# Patient Record
Sex: FEMALE | Race: WHITE | NOT HISPANIC OR LATINO | Employment: OTHER | URBAN - METROPOLITAN AREA
[De-identification: names, ages, dates, MRNs, and addresses within clinical notes are randomized per-mention and may not be internally consistent; named-entity substitution may affect disease eponyms.]

---

## 2023-03-04 PROBLEM — M25.551 RIGHT HIP PAIN: Status: ACTIVE | Noted: 2023-03-04

## 2023-03-04 PROBLEM — H93.13 BILATERAL TINNITUS: Status: ACTIVE | Noted: 2023-03-04

## 2023-03-04 PROBLEM — M79.10 MYALGIA: Status: ACTIVE | Noted: 2023-03-04

## 2023-03-04 PROBLEM — M70.50 PES ANSERINE BURSITIS: Status: ACTIVE | Noted: 2023-03-04

## 2023-03-04 PROBLEM — M54.32 LEFT SIDED SCIATICA: Status: ACTIVE | Noted: 2023-03-04

## 2023-03-04 PROBLEM — M54.42 LEFT-SIDED LOW BACK PAIN WITH LEFT-SIDED SCIATICA: Status: ACTIVE | Noted: 2023-03-04

## 2023-03-04 PROBLEM — K29.80 DUODENITIS: Status: ACTIVE | Noted: 2023-03-04

## 2023-03-04 PROBLEM — G25.81 RESTLESS LEGS: Status: ACTIVE | Noted: 2023-03-04

## 2023-03-04 PROBLEM — M65.341 TRIGGER RING FINGER OF RIGHT HAND: Status: ACTIVE | Noted: 2023-03-04

## 2023-03-04 PROBLEM — J38.3 LESION OF VOCAL CORD: Status: ACTIVE | Noted: 2023-03-04

## 2023-03-04 PROBLEM — M65.331 TRIGGER MIDDLE FINGER OF RIGHT HAND: Status: ACTIVE | Noted: 2023-03-04

## 2023-03-04 PROBLEM — M79.18 MYOFASCIAL PAIN: Status: ACTIVE | Noted: 2023-03-04

## 2023-03-04 PROBLEM — B37.2 CUTANEOUS CANDIDIASIS: Status: ACTIVE | Noted: 2023-03-04

## 2023-03-04 PROBLEM — M19.042 OSTEOARTHRITIS OF FINGERS OF HANDS, BILATERAL: Status: ACTIVE | Noted: 2023-03-04

## 2023-03-04 PROBLEM — H61.23 BILATERAL IMPACTED CERUMEN: Status: ACTIVE | Noted: 2023-03-04

## 2023-03-04 PROBLEM — K58.9 IBS (IRRITABLE BOWEL SYNDROME): Status: ACTIVE | Noted: 2023-03-04

## 2023-03-04 PROBLEM — M53.3 SACROILIAC JOINT DYSFUNCTION: Status: ACTIVE | Noted: 2023-03-04

## 2023-03-04 PROBLEM — M72.0 DUPUYTREN'S CONTRACTURE: Status: ACTIVE | Noted: 2023-03-04

## 2023-03-04 PROBLEM — J06.9 UPPER RESPIRATORY INFECTION: Status: ACTIVE | Noted: 2023-03-04

## 2023-03-04 PROBLEM — R05.9 COUGH: Status: ACTIVE | Noted: 2023-03-04

## 2023-03-04 PROBLEM — J37.0 CHRONIC LARYNGITIS: Status: ACTIVE | Noted: 2023-03-04

## 2023-03-04 PROBLEM — M10.9 GOUT: Status: ACTIVE | Noted: 2023-03-04

## 2023-03-04 PROBLEM — M99.01 SEGMENTAL AND SOMATIC DYSFUNCTION OF CERVICAL REGION: Status: ACTIVE | Noted: 2023-03-04

## 2023-03-04 PROBLEM — R06.2 BILATERAL WHEEZING: Status: ACTIVE | Noted: 2023-03-04

## 2023-03-04 PROBLEM — G56.01 CARPAL TUNNEL SYNDROME OF RIGHT WRIST: Status: ACTIVE | Noted: 2023-03-04

## 2023-03-04 PROBLEM — M54.2 NECK PAIN: Status: ACTIVE | Noted: 2023-03-04

## 2023-03-04 PROBLEM — R13.14 PHARYNGOESOPHAGEAL PHASE DYSPHAGIA: Status: ACTIVE | Noted: 2023-03-04

## 2023-03-04 PROBLEM — J38.3: Status: ACTIVE | Noted: 2023-03-04

## 2023-03-04 PROBLEM — R20.2 ARM PARESTHESIA, RIGHT: Status: ACTIVE | Noted: 2023-03-04

## 2023-03-04 PROBLEM — I48.91 A-FIB (MULTI): Status: ACTIVE | Noted: 2023-03-04

## 2023-03-04 PROBLEM — M75.41 INTERNAL IMPINGEMENT OF RIGHT SHOULDER: Status: ACTIVE | Noted: 2023-03-04

## 2023-03-04 PROBLEM — G58.8 INTERCOSTAL NEURALGIA: Status: ACTIVE | Noted: 2023-03-04

## 2023-03-04 PROBLEM — R29.898 RUE WEAKNESS: Status: ACTIVE | Noted: 2023-03-04

## 2023-03-04 PROBLEM — I10 ESSENTIAL HYPERTENSION: Status: ACTIVE | Noted: 2023-03-04

## 2023-03-04 PROBLEM — R26.89 BALANCE PROBLEM: Status: ACTIVE | Noted: 2023-03-04

## 2023-03-04 PROBLEM — M17.0 ARTHRITIS OF BOTH KNEES: Status: ACTIVE | Noted: 2023-03-04

## 2023-03-04 PROBLEM — Z98.890 HISTORY OF REVERSE TOTAL REPLACEMENT OF RIGHT SHOULDER JOINT: Status: ACTIVE | Noted: 2023-03-04

## 2023-03-04 PROBLEM — R73.9 BLOOD GLUCOSE ELEVATED: Status: ACTIVE | Noted: 2023-03-04

## 2023-03-04 PROBLEM — H90.6 MIXED CONDUCTIVE AND SENSORINEURAL HEARING LOSS OF BOTH EARS: Status: ACTIVE | Noted: 2023-03-04

## 2023-03-04 PROBLEM — Z96.659 PAIN IN KNEE REGION AFTER TOTAL KNEE REPLACEMENT (CMS-HCC): Status: ACTIVE | Noted: 2023-03-04

## 2023-03-04 PROBLEM — R20.0 NUMBNESS OF LIP: Status: ACTIVE | Noted: 2023-03-04

## 2023-03-04 PROBLEM — N28.9 RENAL INSUFFICIENCY: Status: ACTIVE | Noted: 2023-03-04

## 2023-03-04 PROBLEM — S42.209A PROXIMAL HUMERUS FRACTURE: Status: ACTIVE | Noted: 2023-03-04

## 2023-03-04 PROBLEM — R09.82 POSTNASAL DRIP: Status: ACTIVE | Noted: 2023-03-04

## 2023-03-04 PROBLEM — G89.29 CHRONIC PAIN: Status: ACTIVE | Noted: 2023-03-04

## 2023-03-04 PROBLEM — R92.8 ABNORMALITY OF RIGHT BREAST ON SCREENING MAMMOGRAM: Status: ACTIVE | Noted: 2023-03-04

## 2023-03-04 PROBLEM — R29.898 FINE MOTOR IMPAIRMENT: Status: ACTIVE | Noted: 2023-03-04

## 2023-03-04 PROBLEM — M54.16 LUMBAR RADICULOPATHY: Status: ACTIVE | Noted: 2023-03-04

## 2023-03-04 PROBLEM — E78.5 HYPERLIPIDEMIA: Status: ACTIVE | Noted: 2023-03-04

## 2023-03-04 PROBLEM — M19.90 ARTHRITIS: Status: ACTIVE | Noted: 2023-03-04

## 2023-03-04 PROBLEM — M19.041 OSTEOARTHRITIS OF FINGERS OF HANDS, BILATERAL: Status: ACTIVE | Noted: 2023-03-04

## 2023-03-04 PROBLEM — S29.019A THORACIC MYOFASCIAL STRAIN: Status: ACTIVE | Noted: 2023-03-04

## 2023-03-04 PROBLEM — R49.0 CHRONIC HOARSENESS: Status: ACTIVE | Noted: 2023-03-04

## 2023-03-04 PROBLEM — M25.562 KNEE PAIN, LEFT: Status: ACTIVE | Noted: 2023-03-04

## 2023-03-04 PROBLEM — J31.0 CHRONIC RHINITIS: Status: ACTIVE | Noted: 2023-03-04

## 2023-03-04 PROBLEM — F32.A DEPRESSION: Status: ACTIVE | Noted: 2023-03-04

## 2023-03-04 PROBLEM — R53.83 FATIGUE: Status: ACTIVE | Noted: 2023-03-04

## 2023-03-04 PROBLEM — M54.12 CERVICAL RADICULITIS: Status: ACTIVE | Noted: 2023-03-04

## 2023-03-04 PROBLEM — R00.2 PALPITATIONS: Status: ACTIVE | Noted: 2023-03-04

## 2023-03-04 PROBLEM — R20.0 NUMBNESS OF TONGUE: Status: ACTIVE | Noted: 2023-03-04

## 2023-03-04 PROBLEM — H61.21 HEARING LOSS OF RIGHT EAR DUE TO CERUMEN IMPACTION: Status: ACTIVE | Noted: 2023-03-04

## 2023-03-04 PROBLEM — F41.9 ANXIETY: Status: ACTIVE | Noted: 2023-03-04

## 2023-03-04 PROBLEM — G25.0 BENIGN FAMILIAL TREMOR: Status: ACTIVE | Noted: 2023-03-04

## 2023-03-04 PROBLEM — R05.3 CHRONIC COUGH: Status: ACTIVE | Noted: 2023-03-04

## 2023-03-04 PROBLEM — T84.84XA PAIN IN KNEE REGION AFTER TOTAL KNEE REPLACEMENT (CMS-HCC): Status: ACTIVE | Noted: 2023-03-04

## 2023-03-04 PROBLEM — G89.29 CHRONIC RIGHT SHOULDER PAIN: Status: ACTIVE | Noted: 2023-03-04

## 2023-03-04 PROBLEM — M70.62 TROCHANTERIC BURSITIS OF LEFT HIP: Status: ACTIVE | Noted: 2023-03-04

## 2023-03-04 PROBLEM — M54.50 LUMBAR SPINE PAIN: Status: ACTIVE | Noted: 2023-03-04

## 2023-03-04 PROBLEM — M50.30 DDD (DEGENERATIVE DISC DISEASE), CERVICAL: Status: ACTIVE | Noted: 2023-03-04

## 2023-03-04 PROBLEM — R29.818 FINE MOTOR IMPAIRMENT: Status: ACTIVE | Noted: 2023-03-04

## 2023-03-04 PROBLEM — Z96.651 STATUS POST TOTAL RIGHT KNEE REPLACEMENT: Status: ACTIVE | Noted: 2023-03-04

## 2023-03-04 PROBLEM — E03.9 HYPOTHYROIDISM: Status: ACTIVE | Noted: 2023-03-04

## 2023-03-04 PROBLEM — M25.511 CHRONIC RIGHT SHOULDER PAIN: Status: ACTIVE | Noted: 2023-03-04

## 2023-03-04 PROBLEM — R73.09 ELEVATED GLUCOSE: Status: ACTIVE | Noted: 2023-03-04

## 2023-03-04 PROBLEM — R29.898 HAND WEAKNESS: Status: ACTIVE | Noted: 2023-03-04

## 2023-03-04 PROBLEM — R51.9 HEADACHE: Status: ACTIVE | Noted: 2023-03-04

## 2023-03-04 PROBLEM — J30.9 ALLERGIC RHINITIS: Status: ACTIVE | Noted: 2023-03-04

## 2023-03-04 PROBLEM — Z96.652 STATUS POST TOTAL LEFT KNEE REPLACEMENT: Status: ACTIVE | Noted: 2023-03-04

## 2023-03-04 PROBLEM — E55.9 VITAMIN D DEFICIENCY: Status: ACTIVE | Noted: 2023-03-04

## 2023-03-04 PROBLEM — R10.84 GENERALIZED ABDOMINAL PAIN: Status: ACTIVE | Noted: 2023-03-04

## 2023-03-04 RX ORDER — VITS A,C,E/LUTEIN/MINERALS 300MCG-200
1 TABLET ORAL DAILY
COMMUNITY

## 2023-03-04 RX ORDER — VENLAFAXINE HYDROCHLORIDE 75 MG/1
1 CAPSULE, EXTENDED RELEASE ORAL DAILY
COMMUNITY
Start: 2018-09-18 | End: 2023-08-08

## 2023-03-04 RX ORDER — ACETAMINOPHEN 500 MG
TABLET ORAL
COMMUNITY
Start: 2019-12-02

## 2023-03-04 RX ORDER — LISINOPRIL 20 MG/1
1 TABLET ORAL 2 TIMES DAILY
COMMUNITY
Start: 2013-08-14

## 2023-03-04 RX ORDER — MELOXICAM 7.5 MG/1
7.5 TABLET ORAL DAILY
COMMUNITY
Start: 2022-10-31 | End: 2023-03-08 | Stop reason: ALTCHOICE

## 2023-03-04 RX ORDER — NITROGLYCERIN 0.4 MG/1
0.4 TABLET SUBLINGUAL
COMMUNITY

## 2023-03-04 RX ORDER — CHOLECALCIFEROL (VITAMIN D3) 50 MCG
2000 TABLET ORAL DAILY
COMMUNITY

## 2023-03-04 RX ORDER — PRAVASTATIN SODIUM 40 MG/1
1 TABLET ORAL DAILY
COMMUNITY
Start: 2016-04-08 | End: 2023-05-08

## 2023-03-04 RX ORDER — ACETAMINOPHEN AND DIPHENHYDRAMINE HYDROCHLORIDE 500; 25 MG/1; MG/1
TABLET, FILM COATED ORAL
COMMUNITY
Start: 2015-03-23 | End: 2023-03-08

## 2023-03-04 RX ORDER — SOTALOL HYDROCHLORIDE 120 MG/1
TABLET ORAL
COMMUNITY
Start: 2016-12-20 | End: 2023-10-03 | Stop reason: SDUPTHER

## 2023-03-04 RX ORDER — LEVOTHYROXINE SODIUM 75 UG/1
1 TABLET ORAL DAILY
COMMUNITY
Start: 2014-04-07 | End: 2023-06-05

## 2023-03-04 RX ORDER — ASPIRIN 81 MG/1
1 TABLET ORAL EVERY OTHER DAY
COMMUNITY
Start: 2015-04-20

## 2023-03-04 RX ORDER — CALCIUM CARBONATE 600 MG
1 TABLET ORAL 2 TIMES DAILY
COMMUNITY

## 2023-03-04 RX ORDER — LORATADINE 10 MG/1
TABLET ORAL
COMMUNITY
End: 2023-12-15 | Stop reason: ALTCHOICE

## 2023-03-04 RX ORDER — DILTIAZEM HYDROCHLORIDE 180 MG/1
1 CAPSULE, EXTENDED RELEASE ORAL DAILY
COMMUNITY
Start: 2016-07-15 | End: 2023-11-02 | Stop reason: WASHOUT

## 2023-03-08 ENCOUNTER — OFFICE VISIT (OUTPATIENT)
Dept: PRIMARY CARE | Facility: CLINIC | Age: 78
End: 2023-03-08
Payer: MEDICARE

## 2023-03-08 VITALS
RESPIRATION RATE: 15 BRPM | BODY MASS INDEX: 30.31 KG/M2 | DIASTOLIC BLOOD PRESSURE: 56 MMHG | HEART RATE: 67 BPM | SYSTOLIC BLOOD PRESSURE: 98 MMHG | OXYGEN SATURATION: 98 % | TEMPERATURE: 96.6 F | WEIGHT: 188.6 LBS | HEIGHT: 66 IN

## 2023-03-08 DIAGNOSIS — R05.1 ACUTE COUGH: Primary | ICD-10-CM

## 2023-03-08 PROCEDURE — 3078F DIAST BP <80 MM HG: CPT | Performed by: STUDENT IN AN ORGANIZED HEALTH CARE EDUCATION/TRAINING PROGRAM

## 2023-03-08 PROCEDURE — 1157F ADVNC CARE PLAN IN RCRD: CPT | Performed by: STUDENT IN AN ORGANIZED HEALTH CARE EDUCATION/TRAINING PROGRAM

## 2023-03-08 PROCEDURE — 1159F MED LIST DOCD IN RCRD: CPT | Performed by: STUDENT IN AN ORGANIZED HEALTH CARE EDUCATION/TRAINING PROGRAM

## 2023-03-08 PROCEDURE — 99213 OFFICE O/P EST LOW 20 MIN: CPT | Performed by: STUDENT IN AN ORGANIZED HEALTH CARE EDUCATION/TRAINING PROGRAM

## 2023-03-08 PROCEDURE — 1036F TOBACCO NON-USER: CPT | Performed by: STUDENT IN AN ORGANIZED HEALTH CARE EDUCATION/TRAINING PROGRAM

## 2023-03-08 PROCEDURE — 3074F SYST BP LT 130 MM HG: CPT | Performed by: STUDENT IN AN ORGANIZED HEALTH CARE EDUCATION/TRAINING PROGRAM

## 2023-03-08 RX ORDER — ALBUTEROL SULFATE 90 UG/1
2 AEROSOL, METERED RESPIRATORY (INHALATION) EVERY 4 HOURS PRN
Qty: 8.5 G | Refills: 5 | Status: SHIPPED | OUTPATIENT
Start: 2023-03-08 | End: 2024-03-07

## 2023-03-08 RX ORDER — BENZONATATE 200 MG/1
200 CAPSULE ORAL 3 TIMES DAILY PRN
Qty: 42 CAPSULE | Refills: 0 | Status: SHIPPED | OUTPATIENT
Start: 2023-03-08 | End: 2023-03-27 | Stop reason: SDUPTHER

## 2023-03-08 ASSESSMENT — ENCOUNTER SYMPTOMS
OCCASIONAL FEELINGS OF UNSTEADINESS: 1
COUGH: 1
LOSS OF SENSATION IN FEET: 0
DEPRESSION: 0

## 2023-03-08 NOTE — PROGRESS NOTES
Subjective   Patient ID: Natividad Tena is a 77 y.o. female who presents for Cough (Pt is here for a cough that she has has since the beginning of Feb.).  Had sinus pressure and pain, URI symptoms started at the beginning of Feb.     Yesterday when she was driving, she is starting to loose her breathe  Cough severe-aching all over for it   Used her 's proAir   Having coughing fits   Drinking warm hot liquids, cough drops   Dry mouth    Cough  This is a new problem. The current episode started 1 to 4 weeks ago. The problem has been unchanged. The problem occurs constantly. The cough is Non-productive. Associated symptoms include nasal congestion and postnasal drip. The treatment provided mild relief.       Review of Systems   HENT:  Positive for postnasal drip.    Respiratory:  Positive for cough.        Objective   Physical Exam  Constitutional:       Appearance: Normal appearance.   HENT:      Head:      Comments: Positive cobblestoning  Cardiovascular:      Rate and Rhythm: Normal rate and regular rhythm.      Heart sounds: No murmur heard.  Pulmonary:      Effort: Pulmonary effort is normal. No respiratory distress.      Breath sounds: Normal breath sounds. No wheezing.   Musculoskeletal:      Cervical back: Neck supple.      Left lower leg: No edema.   Neurological:      Mental Status: She is alert.         Assessment/Plan   Diagnoses and all orders for this visit:  Acute cough  Comments:  likely allergy related   recommend tessalon andreas antihistamines recommended   albuterol sent  Orders:  -     benzonatate (Tessalon) 200 mg capsule; Take 1 capsule (200 mg) by mouth 3 times a day as needed for cough. Do not crush or chew.  -     albuterol (ProAir HFA) 90 mcg/actuation inhaler; Inhale 2 puffs every 4 hours if needed for wheezing or shortness of breath.  -     XR chest 2 views; Future

## 2023-03-08 NOTE — ASSESSMENT & PLAN NOTE
Physical exam sig for cobblestoning  Albuterol 2 puffs Q  4-6 hours  Tessalon pearls   CXR  
Patient

## 2023-03-27 ENCOUNTER — OFFICE VISIT (OUTPATIENT)
Dept: PRIMARY CARE | Facility: CLINIC | Age: 78
End: 2023-03-27
Payer: MEDICARE

## 2023-03-27 VITALS
SYSTOLIC BLOOD PRESSURE: 88 MMHG | WEIGHT: 198.2 LBS | DIASTOLIC BLOOD PRESSURE: 50 MMHG | HEIGHT: 65 IN | BODY MASS INDEX: 33.02 KG/M2 | HEART RATE: 67 BPM | OXYGEN SATURATION: 96 % | RESPIRATION RATE: 16 BRPM

## 2023-03-27 DIAGNOSIS — R05.1 ACUTE COUGH: ICD-10-CM

## 2023-03-27 PROCEDURE — 1036F TOBACCO NON-USER: CPT | Performed by: STUDENT IN AN ORGANIZED HEALTH CARE EDUCATION/TRAINING PROGRAM

## 2023-03-27 PROCEDURE — 3078F DIAST BP <80 MM HG: CPT | Performed by: STUDENT IN AN ORGANIZED HEALTH CARE EDUCATION/TRAINING PROGRAM

## 2023-03-27 PROCEDURE — 3074F SYST BP LT 130 MM HG: CPT | Performed by: STUDENT IN AN ORGANIZED HEALTH CARE EDUCATION/TRAINING PROGRAM

## 2023-03-27 PROCEDURE — 1160F RVW MEDS BY RX/DR IN RCRD: CPT | Performed by: STUDENT IN AN ORGANIZED HEALTH CARE EDUCATION/TRAINING PROGRAM

## 2023-03-27 PROCEDURE — 1159F MED LIST DOCD IN RCRD: CPT | Performed by: STUDENT IN AN ORGANIZED HEALTH CARE EDUCATION/TRAINING PROGRAM

## 2023-03-27 PROCEDURE — 1157F ADVNC CARE PLAN IN RCRD: CPT | Performed by: STUDENT IN AN ORGANIZED HEALTH CARE EDUCATION/TRAINING PROGRAM

## 2023-03-27 PROCEDURE — 99213 OFFICE O/P EST LOW 20 MIN: CPT | Performed by: STUDENT IN AN ORGANIZED HEALTH CARE EDUCATION/TRAINING PROGRAM

## 2023-03-27 RX ORDER — BENZONATATE 200 MG/1
200 CAPSULE ORAL 3 TIMES DAILY PRN
Qty: 42 CAPSULE | Refills: 0 | Status: SHIPPED | OUTPATIENT
Start: 2023-03-27 | End: 2023-04-26

## 2023-03-27 RX ORDER — NAPROXEN SODIUM 220 MG
TABLET ORAL EVERY 12 HOURS
COMMUNITY

## 2023-03-27 RX ORDER — IBUPROFEN 200 MG
TABLET ORAL EVERY 8 HOURS
COMMUNITY

## 2023-03-27 RX ORDER — LUTEIN 10 MG
TABLET ORAL EVERY 24 HOURS
COMMUNITY

## 2023-03-27 ASSESSMENT — ENCOUNTER SYMPTOMS
DEPRESSION: 0
LOSS OF SENSATION IN FEET: 0
OCCASIONAL FEELINGS OF UNSTEADINESS: 1
COUGH: 1

## 2023-03-27 ASSESSMENT — PATIENT HEALTH QUESTIONNAIRE - PHQ9
2. FEELING DOWN, DEPRESSED OR HOPELESS: NOT AT ALL
1. LITTLE INTEREST OR PLEASURE IN DOING THINGS: NOT AT ALL
SUM OF ALL RESPONSES TO PHQ9 QUESTIONS 1 AND 2: 0

## 2023-03-27 NOTE — PROGRESS NOTES
Subjective   Patient ID: Natividad Tena is a 77 y.o. female who presents for Cough (Pt is here for her persistent cough. Pt would like x-ray results.).    Pt returns today for further evaluation of her persistent cough.     CXR negative for acute pathology   Tessalon pearls are helpful along with the albuterol  Albuterol wears off in a couple of hours   She feels  Warm water is improving   Feeling that the energy   Working didn't cause the    Wedge pillow has been obtained    Cough  This is a chronic problem. The current episode started 1 to 4 weeks ago. The problem has been gradually improving. The problem occurs hourly. The cough is Non-productive. Bronchiectasis: pulmonary function testing.       Review of Systems   Respiratory:  Positive for cough.        Objective   Physical Exam  Constitutional:       Appearance: Normal appearance.   Cardiovascular:      Rate and Rhythm: Normal rate and regular rhythm.      Heart sounds: No murmur heard.  Pulmonary:      Effort: Pulmonary effort is normal. No respiratory distress.      Breath sounds: Normal breath sounds. No wheezing.   Musculoskeletal:      Cervical back: Neck supple.      Left lower leg: No edema.   Neurological:      Mental Status: She is alert.       Assessment/Plan   Problem List Items Addressed This Visit          Respiratory    Cough    Relevant Medications    benzonatate (Tessalon) 200 mg capsule    Other Relevant Orders    Pulmonary function test

## 2023-05-08 DIAGNOSIS — E78.5 HYPERLIPIDEMIA, UNSPECIFIED HYPERLIPIDEMIA TYPE: Primary | ICD-10-CM

## 2023-05-08 RX ORDER — PRAVASTATIN SODIUM 40 MG/1
TABLET ORAL
Qty: 90 TABLET | Refills: 0 | Status: SHIPPED | OUTPATIENT
Start: 2023-05-08 | End: 2023-08-21

## 2023-06-03 DIAGNOSIS — E03.9 HYPOTHYROIDISM, UNSPECIFIED TYPE: ICD-10-CM

## 2023-06-05 RX ORDER — LEVOTHYROXINE SODIUM 75 UG/1
TABLET ORAL
Qty: 90 TABLET | Refills: 1 | Status: SHIPPED | OUTPATIENT
Start: 2023-06-05 | End: 2023-12-04

## 2023-07-21 ENCOUNTER — HOSPITAL ENCOUNTER (OUTPATIENT)
Dept: DATA CONVERSION | Facility: HOSPITAL | Age: 78
End: 2023-07-21
Attending: INTERNAL MEDICINE
Payer: MEDICARE

## 2023-07-21 DIAGNOSIS — R13.10 DYSPHAGIA, UNSPECIFIED: ICD-10-CM

## 2023-07-21 DIAGNOSIS — K29.61 OTHER GASTRITIS WITH BLEEDING: ICD-10-CM

## 2023-07-31 LAB
COMPLETE PATHOLOGY REPORT: NORMAL
CONVERTED CLINICAL DIAGNOSIS-HISTORY: NORMAL
CONVERTED FINAL DIAGNOSIS: NORMAL
CONVERTED FINAL REPORT PDF LINK TO COPY AND PASTE: NORMAL
CONVERTED GROSS DESCRIPTION: NORMAL

## 2023-08-08 DIAGNOSIS — F41.9 ANXIETY: ICD-10-CM

## 2023-08-08 RX ORDER — VENLAFAXINE HYDROCHLORIDE 75 MG/1
75 CAPSULE, EXTENDED RELEASE ORAL DAILY
Qty: 30 CAPSULE | Refills: 0 | Status: SHIPPED | OUTPATIENT
Start: 2023-08-08 | End: 2023-09-05 | Stop reason: SDUPTHER

## 2023-08-19 DIAGNOSIS — E78.5 HYPERLIPIDEMIA, UNSPECIFIED HYPERLIPIDEMIA TYPE: ICD-10-CM

## 2023-08-21 RX ORDER — PRAVASTATIN SODIUM 40 MG/1
40 TABLET ORAL DAILY
Qty: 30 TABLET | Refills: 0 | Status: SHIPPED | OUTPATIENT
Start: 2023-08-21 | End: 2023-09-05 | Stop reason: SDUPTHER

## 2023-09-05 ENCOUNTER — OFFICE VISIT (OUTPATIENT)
Dept: PRIMARY CARE | Facility: CLINIC | Age: 78
End: 2023-09-05
Payer: MEDICARE

## 2023-09-05 VITALS
BODY MASS INDEX: 31.86 KG/M2 | DIASTOLIC BLOOD PRESSURE: 68 MMHG | SYSTOLIC BLOOD PRESSURE: 118 MMHG | HEIGHT: 65 IN | RESPIRATION RATE: 19 BRPM | WEIGHT: 191.2 LBS | HEART RATE: 96 BPM | OXYGEN SATURATION: 97 %

## 2023-09-05 DIAGNOSIS — G47.39 OTHER SLEEP APNEA: ICD-10-CM

## 2023-09-05 DIAGNOSIS — J44.9 CHRONIC OBSTRUCTIVE PULMONARY DISEASE, UNSPECIFIED COPD TYPE (MULTI): ICD-10-CM

## 2023-09-05 DIAGNOSIS — F41.9 ANXIETY: ICD-10-CM

## 2023-09-05 DIAGNOSIS — E78.5 HYPERLIPIDEMIA, UNSPECIFIED HYPERLIPIDEMIA TYPE: ICD-10-CM

## 2023-09-05 DIAGNOSIS — I10 HYPERTENSION, UNSPECIFIED TYPE: ICD-10-CM

## 2023-09-05 DIAGNOSIS — G89.29 OTHER CHRONIC PAIN: Primary | ICD-10-CM

## 2023-09-05 PROBLEM — M25.511 RIGHT SHOULDER PAIN: Status: ACTIVE | Noted: 2023-09-05

## 2023-09-05 PROBLEM — M17.12 ARTHRITIS OF KNEE, LEFT: Status: ACTIVE | Noted: 2023-09-05

## 2023-09-05 PROBLEM — J45.909 REACTIVE AIRWAY DISEASE (HHS-HCC): Status: ACTIVE | Noted: 2023-09-05

## 2023-09-05 PROBLEM — R13.10 DYSPHAGIA: Status: ACTIVE | Noted: 2023-09-05

## 2023-09-05 PROBLEM — I48.0 PAROXYSMAL ATRIAL FIBRILLATION (MULTI): Status: ACTIVE | Noted: 2023-09-05

## 2023-09-05 PROBLEM — I20.9 ANGINA PECTORIS (CMS-HCC): Status: ACTIVE | Noted: 2023-09-05

## 2023-09-05 PROBLEM — H93.19 TINNITUS: Status: ACTIVE | Noted: 2023-09-05

## 2023-09-05 PROBLEM — M17.11 OSTEOARTHRITIS OF RIGHT KNEE: Status: ACTIVE | Noted: 2023-09-05

## 2023-09-05 PROBLEM — G45.9 TRANSIENT ISCHEMIC ATTACK: Status: ACTIVE | Noted: 2023-09-05

## 2023-09-05 PROBLEM — M25.569 KNEE PAIN: Status: ACTIVE | Noted: 2023-09-05

## 2023-09-05 PROBLEM — M54.50 LOW BACK PAIN: Status: ACTIVE | Noted: 2023-09-05

## 2023-09-05 PROBLEM — K29.70 GASTRITIS: Status: ACTIVE | Noted: 2023-09-05

## 2023-09-05 PROBLEM — M54.16 ACUTE LEFT LUMBAR RADICULOPATHY: Status: ACTIVE | Noted: 2023-09-05

## 2023-09-05 PROBLEM — R29.818 SUSPECTED SLEEP APNEA: Status: ACTIVE | Noted: 2023-09-05

## 2023-09-05 PROBLEM — R91.8 LUNG NODULES: Status: ACTIVE | Noted: 2023-09-05

## 2023-09-05 PROBLEM — R06.09 DOE (DYSPNEA ON EXERTION): Status: ACTIVE | Noted: 2023-09-05

## 2023-09-05 PROBLEM — G47.33 MILD OBSTRUCTIVE SLEEP APNEA: Status: ACTIVE | Noted: 2023-09-05

## 2023-09-05 PROCEDURE — 1157F ADVNC CARE PLAN IN RCRD: CPT | Performed by: STUDENT IN AN ORGANIZED HEALTH CARE EDUCATION/TRAINING PROGRAM

## 2023-09-05 PROCEDURE — 1036F TOBACCO NON-USER: CPT | Performed by: STUDENT IN AN ORGANIZED HEALTH CARE EDUCATION/TRAINING PROGRAM

## 2023-09-05 PROCEDURE — 3074F SYST BP LT 130 MM HG: CPT | Performed by: STUDENT IN AN ORGANIZED HEALTH CARE EDUCATION/TRAINING PROGRAM

## 2023-09-05 PROCEDURE — 1159F MED LIST DOCD IN RCRD: CPT | Performed by: STUDENT IN AN ORGANIZED HEALTH CARE EDUCATION/TRAINING PROGRAM

## 2023-09-05 PROCEDURE — 1160F RVW MEDS BY RX/DR IN RCRD: CPT | Performed by: STUDENT IN AN ORGANIZED HEALTH CARE EDUCATION/TRAINING PROGRAM

## 2023-09-05 PROCEDURE — 3078F DIAST BP <80 MM HG: CPT | Performed by: STUDENT IN AN ORGANIZED HEALTH CARE EDUCATION/TRAINING PROGRAM

## 2023-09-05 PROCEDURE — 99214 OFFICE O/P EST MOD 30 MIN: CPT | Performed by: STUDENT IN AN ORGANIZED HEALTH CARE EDUCATION/TRAINING PROGRAM

## 2023-09-05 PROCEDURE — 1126F AMNT PAIN NOTED NONE PRSNT: CPT | Performed by: STUDENT IN AN ORGANIZED HEALTH CARE EDUCATION/TRAINING PROGRAM

## 2023-09-05 RX ORDER — MELOXICAM 7.5 MG/1
TABLET ORAL
COMMUNITY
Start: 2022-10-31

## 2023-09-05 RX ORDER — AMITRIPTYLINE HYDROCHLORIDE 10 MG/1
10 TABLET, FILM COATED ORAL NIGHTLY
Qty: 30 TABLET | Refills: 11 | Status: SHIPPED | OUTPATIENT
Start: 2023-09-05 | End: 2024-09-04

## 2023-09-05 RX ORDER — VENLAFAXINE HYDROCHLORIDE 75 MG/1
75 CAPSULE, EXTENDED RELEASE ORAL DAILY
Qty: 90 CAPSULE | Refills: 1 | Status: SHIPPED | OUTPATIENT
Start: 2023-09-05 | End: 2024-04-01

## 2023-09-05 RX ORDER — FLUTICASONE PROPIONATE 50 MCG
SPRAY, SUSPENSION (ML) NASAL EVERY 24 HOURS
COMMUNITY

## 2023-09-05 RX ORDER — ACETAMINOPHEN AND DIPHENHYDRAMINE HYDROCHLORIDE 500; 25 MG/1; MG/1
TABLET, FILM COATED ORAL
COMMUNITY
Start: 2015-03-23

## 2023-09-05 RX ORDER — PRAVASTATIN SODIUM 40 MG/1
40 TABLET ORAL DAILY
Qty: 90 TABLET | Refills: 1 | Status: SHIPPED | OUTPATIENT
Start: 2023-09-05 | End: 2024-04-01

## 2023-09-05 RX ORDER — PANTOPRAZOLE SODIUM 40 MG/1
1 TABLET, DELAYED RELEASE ORAL
COMMUNITY
Start: 2023-08-22 | End: 2023-10-21

## 2023-09-05 RX ORDER — LOPERAMIDE HYDROCHLORIDE 2 MG/1
CAPSULE ORAL
COMMUNITY

## 2023-09-05 RX ORDER — FLUTICASONE PROPIONATE 232 UG/1
POWDER, METERED RESPIRATORY (INHALATION) 2 TIMES DAILY
COMMUNITY
Start: 2023-05-24

## 2023-09-05 ASSESSMENT — PATIENT HEALTH QUESTIONNAIRE - PHQ9
SUM OF ALL RESPONSES TO PHQ9 QUESTIONS 1 AND 2: 0
2. FEELING DOWN, DEPRESSED OR HOPELESS: NOT AT ALL
1. LITTLE INTEREST OR PLEASURE IN DOING THINGS: NOT AT ALL

## 2023-09-05 NOTE — PROGRESS NOTES
Subjective   Patient ID: Natividad Tena is a 77 y.o. female who presents for Follow-up (Pt is here for a follow up and has some concerns about her legs.).    GI found a minor bleed on EGD started on Protonix   Right legs she is getting pain intermittently on the shin now radiating down to the foot, right thigh   Sudden, sharp pain   Keeping her up at night time  Sitting down too long will make it worse   Finding herself trying to get more comfortable               Review of Systems    Objective   Physical Exam  Vitals reviewed.   Constitutional:       Appearance: Normal appearance.   Cardiovascular:      Rate and Rhythm: Normal rate and regular rhythm.      Heart sounds: No murmur heard.  Pulmonary:      Effort: Pulmonary effort is normal. No respiratory distress.      Breath sounds: Normal breath sounds. No wheezing.   Musculoskeletal:      Cervical back: Neck supple.      Left lower leg: No edema.   Neurological:      Mental Status: She is alert.         Assessment/Plan   Diagnoses and all orders for this visit:  Other chronic pain  Comments:  Her leg pain has continued magnesium did not work well for her  We will try amitriptyline 10 mg  Orders:  -     amitriptyline (Elavil) 10 mg tablet; Take 1 tablet (10 mg) by mouth once daily at bedtime.  Anxiety  Comments:  Controlled  Refilled vent Aflaxen 75 mg daily  Orders:  -     venlafaxine XR (Effexor-XR) 75 mg 24 hr capsule; Take 1 capsule (75 mg) by mouth once daily.  Hyperlipidemia, unspecified hyperlipidemia type  Comments:  Lipid panel up-to-date  Pravastatin continue 40 mg daily  Orders:  -     pravastatin (Pravachol) 40 mg tablet; Take 1 tablet (40 mg) by mouth once daily.  Hypertension, unspecified type  Comments:  Controlled  Continue lisinopril 20 mg daily  Continue sotalol 120 mg daily  Chronic obstructive pulmonary disease, unspecified COPD type (CMS/[facility])  Comments:  Good controlContinue albuterol as needed  Continue fluticasone inhaler daily  Other sleep  apnea

## 2023-09-29 VITALS — BODY MASS INDEX: 30.01 KG/M2 | WEIGHT: 180.34 LBS

## 2023-09-30 NOTE — H&P
History & Physical Reviewed:   I have reviewed the History and Physical dated:  18-Jul-2023   History and Physical reviewed and relevant findings noted. Patient examined to review pertinent physical  findings.: No significant changes   Home Medications Reviewed: no changes noted   Allergies Reviewed: no changes noted       ERAS (Enhanced Recovery After Surgery):  ·  ERAS Patient: no     Consent:   COVID-19 Consent:  ·  COVID-19 Risk Consent Surgeon has reviewed key risks related to the risk of khadar COVID-19 and if they contract COVID-19 what the risks are.       Electronic Signatures:  Waqas Vernon ()   (Signed 23-Jul-2023 08:34)   Co-Signer: History & Physical Reviewed, Note Completion, ERAS, Consent  Betty Rowe (PAC)   (Signed 21-Jul-2023 12:56)   Entered: History & Physical Reviewed, Note Completion, ERAS, Consent   Authored: History & Physical Reviewed, ERAS, Consent, Note Completion    Last Updated: 23-Jul-2023 08:34 by Waqas Vernon ()

## 2023-10-03 ENCOUNTER — TELEPHONE (OUTPATIENT)
Dept: PULMONOLOGY | Facility: CLINIC | Age: 78
End: 2023-10-03
Payer: MEDICARE

## 2023-10-03 DIAGNOSIS — I48.11 LONGSTANDING PERSISTENT ATRIAL FIBRILLATION (MULTI): Primary | ICD-10-CM

## 2023-10-03 RX ORDER — SOTALOL HYDROCHLORIDE 120 MG/1
120 TABLET ORAL 2 TIMES DAILY
Qty: 180 TABLET | Refills: 0 | Status: SHIPPED | OUTPATIENT
Start: 2023-10-03 | End: 2024-03-25 | Stop reason: SDUPTHER

## 2023-10-05 ENCOUNTER — LAB REQUISITION (OUTPATIENT)
Dept: LAB | Facility: HOSPITAL | Age: 78
End: 2023-10-05
Payer: MEDICARE

## 2023-10-05 DIAGNOSIS — N39.0 URINARY TRACT INFECTION, SITE NOT SPECIFIED: ICD-10-CM

## 2023-10-05 PROCEDURE — 87086 URINE CULTURE/COLONY COUNT: CPT

## 2023-10-07 LAB — BACTERIA UR CULT: ABNORMAL

## 2023-11-27 ENCOUNTER — OFFICE VISIT (OUTPATIENT)
Dept: PRIMARY CARE | Facility: CLINIC | Age: 78
End: 2023-11-27
Payer: MEDICARE

## 2023-11-27 VITALS
DIASTOLIC BLOOD PRESSURE: 52 MMHG | SYSTOLIC BLOOD PRESSURE: 120 MMHG | RESPIRATION RATE: 17 BRPM | OXYGEN SATURATION: 98 % | HEIGHT: 65 IN | WEIGHT: 189.2 LBS | HEART RATE: 73 BPM | BODY MASS INDEX: 31.52 KG/M2

## 2023-11-27 DIAGNOSIS — L98.9 SKIN LESION: Primary | ICD-10-CM

## 2023-11-27 DIAGNOSIS — R68.2 DRY MOUTH: ICD-10-CM

## 2023-11-27 PROCEDURE — 3078F DIAST BP <80 MM HG: CPT | Performed by: STUDENT IN AN ORGANIZED HEALTH CARE EDUCATION/TRAINING PROGRAM

## 2023-11-27 PROCEDURE — 1126F AMNT PAIN NOTED NONE PRSNT: CPT | Performed by: STUDENT IN AN ORGANIZED HEALTH CARE EDUCATION/TRAINING PROGRAM

## 2023-11-27 PROCEDURE — 99213 OFFICE O/P EST LOW 20 MIN: CPT | Performed by: STUDENT IN AN ORGANIZED HEALTH CARE EDUCATION/TRAINING PROGRAM

## 2023-11-27 PROCEDURE — 3074F SYST BP LT 130 MM HG: CPT | Performed by: STUDENT IN AN ORGANIZED HEALTH CARE EDUCATION/TRAINING PROGRAM

## 2023-11-27 PROCEDURE — 1160F RVW MEDS BY RX/DR IN RCRD: CPT | Performed by: STUDENT IN AN ORGANIZED HEALTH CARE EDUCATION/TRAINING PROGRAM

## 2023-11-27 PROCEDURE — 1159F MED LIST DOCD IN RCRD: CPT | Performed by: STUDENT IN AN ORGANIZED HEALTH CARE EDUCATION/TRAINING PROGRAM

## 2023-11-27 PROCEDURE — 1036F TOBACCO NON-USER: CPT | Performed by: STUDENT IN AN ORGANIZED HEALTH CARE EDUCATION/TRAINING PROGRAM

## 2023-11-27 RX ORDER — MAGNESIUM 250 MG
TABLET ORAL
COMMUNITY

## 2023-11-27 NOTE — PROGRESS NOTES
Subjective   Patient ID: Natividad Tena is a 78 y.o. female who presents for Follow-up (Pt is here for a follow up and to go over her test and what she is going through.).    RSV, COVID-19 and Influenza vaccines updated    Amitriptyline caused increased tearfulness   Magnesium legs pains have doing good       Did not have water in her CPAP cause nose bleeding  Mouth is dry     Sharp pain between shoulder blades  Squeezing   Intermittent without warning           Objective   Physical Exam  Vitals reviewed.   Constitutional:       Appearance: Normal appearance.   Cardiovascular:      Rate and Rhythm: Normal rate and regular rhythm.      Heart sounds: No murmur heard.  Pulmonary:      Effort: Pulmonary effort is normal. No respiratory distress.      Breath sounds: Normal breath sounds. No wheezing.   Musculoskeletal:      Cervical back: Neck supple.      Left lower leg: No edema.   Skin:            Comments: White splayed star shaped lesion on the left anterior proximal forearm   Neurological:      Mental Status: She is alert.         Assessment/Plan   Problem List Items Addressed This Visit    None  Visit Diagnoses         Codes    Skin lesion    -  Primary L98.9    Relevant Orders    Referral to Dermatology    Dry mouth     R68.2    Relevant Medications    moisturizing mouth (Biotene Oral Dry Mouth) solution

## 2023-11-28 PROBLEM — J06.9 UPPER RESPIRATORY INFECTION: Status: RESOLVED | Noted: 2023-03-04 | Resolved: 2023-11-28

## 2023-11-28 PROBLEM — I20.9 ANGINA PECTORIS (CMS-HCC): Status: RESOLVED | Noted: 2023-09-05 | Resolved: 2023-11-28

## 2023-12-04 DIAGNOSIS — E03.9 HYPOTHYROIDISM, UNSPECIFIED TYPE: ICD-10-CM

## 2023-12-04 RX ORDER — LEVOTHYROXINE SODIUM 75 UG/1
75 TABLET ORAL DAILY
Qty: 90 TABLET | Refills: 1 | Status: SHIPPED | OUTPATIENT
Start: 2023-12-04 | End: 2024-05-28 | Stop reason: SDUPTHER

## 2023-12-13 ENCOUNTER — APPOINTMENT (OUTPATIENT)
Dept: SLEEP MEDICINE | Facility: CLINIC | Age: 78
End: 2023-12-13
Payer: MEDICARE

## 2023-12-15 ENCOUNTER — OFFICE VISIT (OUTPATIENT)
Dept: SLEEP MEDICINE | Facility: CLINIC | Age: 78
End: 2023-12-15
Payer: MEDICARE

## 2023-12-15 VITALS
DIASTOLIC BLOOD PRESSURE: 72 MMHG | SYSTOLIC BLOOD PRESSURE: 134 MMHG | WEIGHT: 189 LBS | HEART RATE: 62 BPM | BODY MASS INDEX: 31.49 KG/M2 | OXYGEN SATURATION: 97 % | HEIGHT: 65 IN

## 2023-12-15 DIAGNOSIS — K11.7 XEROSTOMIA: ICD-10-CM

## 2023-12-15 DIAGNOSIS — G47.19 EXCESSIVE DAYTIME SLEEPINESS: ICD-10-CM

## 2023-12-15 DIAGNOSIS — G47.33 OSA (OBSTRUCTIVE SLEEP APNEA): Primary | ICD-10-CM

## 2023-12-15 DIAGNOSIS — I10 ESSENTIAL HYPERTENSION: ICD-10-CM

## 2023-12-15 PROCEDURE — 99214 OFFICE O/P EST MOD 30 MIN: CPT | Performed by: PHYSICIAN ASSISTANT

## 2023-12-15 PROCEDURE — 3075F SYST BP GE 130 - 139MM HG: CPT | Performed by: PHYSICIAN ASSISTANT

## 2023-12-15 PROCEDURE — 1036F TOBACCO NON-USER: CPT | Performed by: PHYSICIAN ASSISTANT

## 2023-12-15 PROCEDURE — 1126F AMNT PAIN NOTED NONE PRSNT: CPT | Performed by: PHYSICIAN ASSISTANT

## 2023-12-15 PROCEDURE — 3078F DIAST BP <80 MM HG: CPT | Performed by: PHYSICIAN ASSISTANT

## 2023-12-15 PROCEDURE — 1160F RVW MEDS BY RX/DR IN RCRD: CPT | Performed by: PHYSICIAN ASSISTANT

## 2023-12-15 PROCEDURE — 1159F MED LIST DOCD IN RCRD: CPT | Performed by: PHYSICIAN ASSISTANT

## 2023-12-15 RX ORDER — DILTIAZEM HYDROCHLORIDE 180 MG/1
180 CAPSULE, EXTENDED RELEASE ORAL DAILY
Qty: 30 CAPSULE | Refills: 0 | Status: SHIPPED | OUTPATIENT
Start: 2023-12-15 | End: 2023-12-21 | Stop reason: SDUPTHER

## 2023-12-15 ASSESSMENT — SLEEP AND FATIGUE QUESTIONNAIRES
HOW LIKELY ARE YOU TO NOD OFF OR FALL ASLEEP WHILE SITTING AND TALKING TO SOMEONE: WOULD NEVER DOZE
HOW LIKELY ARE YOU TO NOD OFF OR FALL ASLEEP WHILE SITTING QUIETLY AFTER LUNCH WITHOUT ALCOHOL: MODERATE CHANCE OF DOZING
ESS-CHAD TOTAL SCORE: 13
HOW LIKELY ARE YOU TO NOD OFF OR FALL ASLEEP WHILE WATCHING TV: HIGH CHANCE OF DOZING
HOW LIKELY ARE YOU TO NOD OFF OR FALL ASLEEP IN A CAR, WHILE STOPPED FOR A FEW MINUTES IN TRAFFIC: WOULD NEVER DOZE
SITING INACTIVE IN A PUBLIC PLACE LIKE A CLASS ROOM OR A MOVIE THEATER: SLIGHT CHANCE OF DOZING
HOW LIKELY ARE YOU TO NOD OFF OR FALL ASLEEP WHILE LYING DOWN TO REST IN THE AFTERNOON WHEN CIRCUMSTANCES PERMIT: HIGH CHANCE OF DOZING
HOW LIKELY ARE YOU TO NOD OFF OR FALL ASLEEP WHEN YOU ARE A PASSENGER IN A CAR FOR AN HOUR WITHOUT A BREAK: HIGH CHANCE OF DOZING
HOW LIKELY ARE YOU TO NOD OFF OR FALL ASLEEP WHILE SITTING AND READING: SLIGHT CHANCE OF DOZING

## 2023-12-15 ASSESSMENT — PAIN SCALES - GENERAL: PAINLEVEL: 0-NO PAIN

## 2023-12-15 NOTE — PROGRESS NOTES
[facility]  Followup Visit Note    HISTORY OF PRESENT ILLNESS   Natividad Tena is a 78 y.o. female who presents to a [facility] for followup.       Current History    On today's visit, the patient reports she is benefiting from CPAP. She feels she has less sleepiness during the day.     She is having dry mouth with CPAP. She also feels the pressure is too low when she starts using CPAP. She also has nasal irritation from current mask.     She is about to run out of DILT-XR.      PREVIOUS VISIT 9/13/2023  OBSTRUCTIVE SLEEP APNEA, MILD / FATIGUE  -Reviewed test results with patient in detail  -Discussed treatment options including PAP, oral appliance, and INSPIRE  -Will order new APAP with pressure 5-15 and supplies from [facility]  -Discussed mask options, compliance requirements, tips to increase tolerance  -Goal of CPAP is less daytime sleepiness, better BP  HYPERTENSION  -/78 Today  -Well controlled with current medications   DRY MOUTH  -Consider change to nortriptyline if dry mouth doesn't improve  -May benefit from CPAP therapy  OVERWEIGHT BMI 25-30  -BMI 31 today  -Recommend healthy diet and exercise to help maintain healthy weight  -Reviewed effect of obesity and excess weight on ALLEGRA   Followup 31-90 days after starting CPAP for compliance checkup     PAP Adherence:  Durable Medical Equipment Company: [employer]  Pressure Range: 5-15 cm H2O Mask: heather villanueva NP    A PAP adherence download was obtained and data was reviewed personally today in clinic. (see scanned document in EPIC)  % days >4 hours use: 97  Average use : 6 hr 18 min  95% pressure 14.7 cm H2O  95% leak : 40.2 L/min  AHI: 6.9    Sleep Scales:  ESS: 13     REVIEW OF SYSTEMS    All other systems negative      ALLERGIES AND MEDICATIONS     ALLERGIES  Allergies   Allergen Reactions    Bee Pollen Unknown    Cefdinir Diarrhea, Other and Unknown     N&V    Ciprofloxacin Other and Unknown      NAUSEA, GI UPSET    Iodinated Contrast Media Other    Iodine Unknown and Other     Iodine Crystals       MEDICATIONS: She has a current medication list which includes the following prescription(s): acetaminophen - take 2 tablets every 8 hours for 5 days, after 5 days take 2 tablets every 8 hours only as needed for pain, albuterol - Inhale 2 puffs every 4 hours if needed for wheezing or shortness of breath, aspirin - Take 1 tablet (81 mg) by mouth every other day, calcium carbonate - Take 1 tablet (1,500 mg) by mouth 2 times a day, cetirizine - Take 1 capsule (10 mg) by mouth once daily at bedtime, cholecalciferol - Take 1 tablet (2,000 Units) by mouth once daily, dilt-xr - Take 1 capsule (180 mg) by mouth once daily, tylenol pm extra strength - Take by mouth, fluticasone - once every 24 hours, armonair digihaler - Inhale twice a day, ibuprofen - every 8 hours, levothyroxine - TAKE ONE TABLET BY MOUTH EVERY DAY, lisinopril - Take 1 tablet (20 mg) by mouth 2 times a day, loperamide - Take by mouth, lutein - once every 24 hours, magnesium - Take by mouth, meloxicam - Take by mouth, moisturizing mouth - Take 2 sprays by mouth if needed (dry mouth), naproxen sodium - every 12 hours, nitroglycerin - Place 1 tablet (0.4 mg) under the tongue. As directed may be repeated every 5 minutes up to a maximum of 3 doses in a 15 minute period, pravastatin - Take 1 tablet (40 mg) by mouth once daily, sotalol - Take 1 tablet (120 mg) by mouth 2 times a day, venlafaxine xr - Take 1 capsule (75 mg) by mouth once daily, ocuvite with lutein - Take 1 tablet by mouth once daily, vitamin b complex - Take 1 tablet by mouth once daily, amitriptyline - Take 1 tablet (10 mg) by mouth once daily at bedtime (Patient not taking: Reported on 11/27/2023), and pantoprazole - Take 1 tablet (40 mg) by mouth once daily in the morning. Take before meals.    PAST MEDICAL HISTORY : She  has a past medical history of Acute maxillary sinusitis, unspecified  "(2016), Allergic rhinitis, unspecified (2013), Anemia, unspecified (2013), Cough, unspecified (12/15/2015), Cough, unspecified (2014), Cough, unspecified (2015), Dysphonia, Gastro-esophageal reflux disease without esophagitis (2013), Generalized abdominal pain (2014), Other cervical disc displacement, unspecified cervical region, Personal history of other diseases of the respiratory system (2014), Personal history of other diseases of the respiratory system (04/10/2014), Personal history of transient ischemic attack (TIA), and 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    PAST SURGICAL HISTORY: She  has a past surgical history that includes Bladder surgery (2014);  section, classic (2014); Hysterectomy (2014); and Eye surgery (2014).     FAMILY HISTORY: No changes since previous visit. Otherwise non-contributory as charted.       SOCIAL HISTORY  She  reports that she has never smoked. She has never used smokeless tobacco. She reports that she does not currently use alcohol. She reports that she does not use drugs.       PHYSICAL EXAM     VITAL SIGNS: /72   Pulse 62   Ht 1.651 m (5' 5\")   Wt 85.7 kg (189 lb)   SpO2 97%   BMI 31.45 kg/m²      CURRENT WEIGHT: [unfilled]  BMI: [unfilled]  PREVIOUS WEIGHTS:  Wt Readings from Last 3 Encounters:   12/15/23 85.7 kg (189 lb)   23 85.8 kg (189 lb 3.2 oz)   23 86.7 kg (191 lb 3.2 oz)       Constitutional: Alert and oriented, cooperative, no obvious distress   HEENT: Non icteric or anemic, EOM WNL bilaterally   Neck: Supple, no JVD, no goiter, no adenopathy, no rigidity  Extremities: No clubbing, no LL edema   Neuromuscular: Cranial nerves grossly intact, no focal deficits     RESULTS/DATA     Iron (ug/dL)   Date Value   2019 116     Iron Saturation (%)   Date " Value   12/26/2019 35     TIBC (ug/dL)   Date Value   12/26/2019 329     Ferritin (ug/L)   Date Value   12/26/2019 84         ASSESSMENT/PLAN     Ms. Tena is a 78 y.o. female and returns in followup for the following issues:    OBSTRUCTIVE SLEEP APNEA / EDS  -Benefiting from CPAP  -turn ramp off, change to n30 mask  -increase humidity for dry mouth  -new supplies ordered  -Tolerating mask, pressure, humidity well     HYPERTENSION  -/72 today  -1 month refill of diltazem     OBESITY  -BMI 31 TODAY    Follow up 3-6 months

## 2023-12-21 DIAGNOSIS — I10 ESSENTIAL HYPERTENSION: ICD-10-CM

## 2023-12-21 RX ORDER — DILTIAZEM HYDROCHLORIDE 180 MG/1
180 CAPSULE, EXTENDED RELEASE ORAL DAILY
Qty: 90 CAPSULE | Refills: 0 | Status: SHIPPED | OUTPATIENT
Start: 2023-12-21 | End: 2024-03-25 | Stop reason: SDUPTHER

## 2023-12-21 NOTE — TELEPHONE ENCOUNTER
Requested Prescriptions     Pending Prescriptions Disp Refills    DILT- mg 24 hr capsule 90 capsule 0     Sig: Take 1 capsule (180 mg) by mouth once daily.     .sendrx

## 2024-03-25 ENCOUNTER — OFFICE VISIT (OUTPATIENT)
Dept: CARDIOLOGY | Facility: CLINIC | Age: 79
End: 2024-03-25
Payer: MEDICARE

## 2024-03-25 VITALS
BODY MASS INDEX: 30.16 KG/M2 | DIASTOLIC BLOOD PRESSURE: 61 MMHG | TEMPERATURE: 98.6 F | HEART RATE: 51 BPM | WEIGHT: 181 LBS | RESPIRATION RATE: 18 BRPM | HEIGHT: 65 IN | SYSTOLIC BLOOD PRESSURE: 116 MMHG | OXYGEN SATURATION: 96 %

## 2024-03-25 DIAGNOSIS — E78.2 MIXED HYPERLIPIDEMIA: ICD-10-CM

## 2024-03-25 DIAGNOSIS — I48.11 LONGSTANDING PERSISTENT ATRIAL FIBRILLATION (MULTI): ICD-10-CM

## 2024-03-25 DIAGNOSIS — R01.1 HEART MURMUR: ICD-10-CM

## 2024-03-25 DIAGNOSIS — I10 PRIMARY HYPERTENSION: Primary | ICD-10-CM

## 2024-03-25 DIAGNOSIS — I10 ESSENTIAL HYPERTENSION: ICD-10-CM

## 2024-03-25 DIAGNOSIS — R00.2 PALPITATIONS: ICD-10-CM

## 2024-03-25 PROCEDURE — 1159F MED LIST DOCD IN RCRD: CPT | Performed by: INTERNAL MEDICINE

## 2024-03-25 PROCEDURE — 1157F ADVNC CARE PLAN IN RCRD: CPT | Performed by: INTERNAL MEDICINE

## 2024-03-25 PROCEDURE — 3074F SYST BP LT 130 MM HG: CPT | Performed by: INTERNAL MEDICINE

## 2024-03-25 PROCEDURE — 3078F DIAST BP <80 MM HG: CPT | Performed by: INTERNAL MEDICINE

## 2024-03-25 PROCEDURE — 1036F TOBACCO NON-USER: CPT | Performed by: INTERNAL MEDICINE

## 2024-03-25 PROCEDURE — 1126F AMNT PAIN NOTED NONE PRSNT: CPT | Performed by: INTERNAL MEDICINE

## 2024-03-25 PROCEDURE — 99214 OFFICE O/P EST MOD 30 MIN: CPT | Performed by: INTERNAL MEDICINE

## 2024-03-25 RX ORDER — DILTIAZEM HYDROCHLORIDE 180 MG/1
180 CAPSULE, EXTENDED RELEASE ORAL DAILY
Qty: 90 CAPSULE | Refills: 3 | Status: SHIPPED | OUTPATIENT
Start: 2024-03-25 | End: 2025-03-20

## 2024-03-25 RX ORDER — SOTALOL HYDROCHLORIDE 120 MG/1
120 TABLET ORAL 2 TIMES DAILY
Qty: 180 TABLET | Refills: 3 | Status: SHIPPED | OUTPATIENT
Start: 2024-03-25 | End: 2025-03-20

## 2024-03-25 SDOH — ECONOMIC STABILITY: INCOME INSECURITY: IN THE LAST 12 MONTHS, WAS THERE A TIME WHEN YOU WERE NOT ABLE TO PAY THE MORTGAGE OR RENT ON TIME?: NO

## 2024-03-25 SDOH — ECONOMIC STABILITY: HOUSING INSECURITY
IN THE LAST 12 MONTHS, WAS THERE A TIME WHEN YOU DID NOT HAVE A STEADY PLACE TO SLEEP OR SLEPT IN A SHELTER (INCLUDING NOW)?: NO

## 2024-03-25 SDOH — ECONOMIC STABILITY: HOUSING INSECURITY: IN THE LAST 12 MONTHS, HOW MANY PLACES HAVE YOU LIVED?: 1

## 2024-03-25 ASSESSMENT — LIFESTYLE VARIABLES
HOW OFTEN DO YOU HAVE A DRINK CONTAINING ALCOHOL: NEVER
HOW MANY STANDARD DRINKS CONTAINING ALCOHOL DO YOU HAVE ON A TYPICAL DAY: PATIENT DOES NOT DRINK
AUDIT-C TOTAL SCORE: 0
HOW OFTEN DO YOU HAVE SIX OR MORE DRINKS ON ONE OCCASION: NEVER
SKIP TO QUESTIONS 9-10: 1

## 2024-03-25 ASSESSMENT — ANXIETY QUESTIONNAIRES
5. BEING SO RESTLESS THAT IT IS HARD TO SIT STILL: NOT AT ALL
4. TROUBLE RELAXING: NOT AT ALL
2. NOT BEING ABLE TO STOP OR CONTROL WORRYING: NOT AT ALL
IF YOU CHECKED OFF ANY PROBLEMS ON THIS QUESTIONNAIRE, HOW DIFFICULT HAVE THESE PROBLEMS MADE IT FOR YOU TO DO YOUR WORK, TAKE CARE OF THINGS AT HOME, OR GET ALONG WITH OTHER PEOPLE: NOT DIFFICULT AT ALL
GAD7 TOTAL SCORE: 0
6. BECOMING EASILY ANNOYED OR IRRITABLE: NOT AT ALL
1. FEELING NERVOUS, ANXIOUS, OR ON EDGE: NOT AT ALL
7. FEELING AFRAID AS IF SOMETHING AWFUL MIGHT HAPPEN: NOT AT ALL
3. WORRYING TOO MUCH ABOUT DIFFERENT THINGS: NOT AT ALL

## 2024-03-25 ASSESSMENT — COLUMBIA-SUICIDE SEVERITY RATING SCALE - C-SSRS
6. HAVE YOU EVER DONE ANYTHING, STARTED TO DO ANYTHING, OR PREPARED TO DO ANYTHING TO END YOUR LIFE?: NO
2. HAVE YOU ACTUALLY HAD ANY THOUGHTS OF KILLING YOURSELF?: NO
1. IN THE PAST MONTH, HAVE YOU WISHED YOU WERE DEAD OR WISHED YOU COULD GO TO SLEEP AND NOT WAKE UP?: NO

## 2024-03-25 ASSESSMENT — PAIN SCALES - GENERAL: PAINLEVEL: 0-NO PAIN

## 2024-03-25 ASSESSMENT — ENCOUNTER SYMPTOMS
OCCASIONAL FEELINGS OF UNSTEADINESS: 1
DEPRESSION: 0
LOSS OF SENSATION IN FEET: 0

## 2024-03-25 NOTE — PROGRESS NOTES
Subjective   Chief Complaint   Patient presents with    Follow-up     Mrs. Natividad Tena presents to the office today for a 6 month follow up.         78-year-old female patient with history of hypertension, palpitation fluttering.  Patient was recently started a CPAP machine that helps her oxygenation at nighttime and elevated palpitation fluttering  Patient currently on sotalol as well as diltiazem.  No active angina or CHF signs symptoms were stable cardiac perspective.  Patient with had a negative nuclear stress test for ischemia in February 2023.  Normal ejection fraction at time.    Past Medical History:   Diagnosis Date    A-fib (CMS/Formerly [facility]) 03/04/2023    Acute maxillary sinusitis, unspecified 01/25/2016    Acute maxillary sinusitis    Allergic rhinitis, unspecified 12/07/2013    Allergic rhinitis    Anemia, unspecified 12/07/2013    Anemia    Cough, unspecified 12/15/2015    Cough    Cough, unspecified 07/23/2014    Cough    Cough, unspecified 03/23/2015    Cough    MARTE (dyspnea on exertion) 09/05/2023    Dysphonia     Dysphonia    Gastro-esophageal reflux disease without esophagitis 12/07/2013    Esophageal reflux    Generalized abdominal pain 07/31/2014    Generalized abdominal pain    Hyperlipidemia 03/04/2023    Hypertension 03/04/2023    Other cervical disc displacement, unspecified cervical region     Herniated disc, cervical    Palpitations 03/04/2023    Paroxysmal atrial fibrillation (CMS/Formerly [facility]) 09/05/2023    Personal history of other diseases of the respiratory system 12/03/2014    History of allergic rhinitis    Personal history of other diseases of the respiratory system 04/10/2014    History of allergic rhinitis    Personal history of transient ischemic attack (TIA), and cerebral infarction without residual deficits     History of transient cerebral ischemia    Unspecified fracture of shaft of humerus, right arm, initial encounter for closed fracture 04/03/2017    Fracture of humerus, right, closed      Past Surgical History:   Procedure Laterality Date    BLADDER SURGERY  2014    Bladder Surgery     SECTION, CLASSIC  2014     Section    EYE SURGERY  2014    Eye Surgery    HYSTERECTOMY  2014    Hysterectomy     No relevant family history has been documented for this patient.  Current Outpatient Medications   Medication Sig Dispense Refill    acetaminophen (Tylenol) 500 mg tablet take 2 tablets every 8 hours for 5 days, after 5 days take 2 tablets every 8 hours only as needed for pain      aspirin 81 mg EC tablet Take 1 tablet (81 mg) by mouth every other day.      calcium carbonate 600 mg calcium (1,500 mg) tablet Take 1 tablet (1,500 mg) by mouth 2 times a day.      cholecalciferol (Vitamin D-3) 50 MCG (2000 UT) tablet Take 1 tablet (2,000 Units) by mouth once daily.      DILT- mg 24 hr capsule Take 1 capsule (180 mg) by mouth once daily. 90 capsule 3    diphenhydrAMINE-acetaminophen (Tylenol PM Extra Strength)  mg per tablet Take by mouth.      levothyroxine (Synthroid, Levoxyl) 75 mcg tablet TAKE ONE TABLET BY MOUTH EVERY DAY 90 tablet 1    lisinopril 20 mg tablet Take 1 tablet (20 mg) by mouth 2 times a day.      loperamide (Imodium) 2 mg capsule Take by mouth.      lutein 10 mg tablet once every 24 hours.      magnesium 250 mg tablet Take by mouth.      moisturizing mouth (Biotene Oral Dry Mouth) solution Take 2 sprays by mouth if needed (dry mouth). 44.3 mL 1    nitroglycerin (Nitrostat) 0.4 mg SL tablet Place 1 tablet (0.4 mg) under the tongue. As directed may be repeated every 5 minutes up to a maximum of 3 doses in a 15 minute period.      pravastatin (Pravachol) 40 mg tablet Take 1 tablet (40 mg) by mouth once daily. 90 tablet 1    sotalol (Betapace) 120 mg tablet Take 1 tablet (120 mg) by mouth 2 times a day. 180 tablet 3    venlafaxine XR (Effexor-XR) 75 mg 24 hr capsule Take 1 capsule (75 mg) by mouth once daily. 90 capsule 1    vit A,C and  "E-lutein-minerals (Ocuvite with Lutein) 300 mcg-200 mg-27 mg-2 mg tablet Take 1 tablet by mouth once daily.      VITAMIN B COMPLEX ORAL Take 1 tablet by mouth once daily.      albuterol (ProAir HFA) 90 mcg/actuation inhaler Inhale 2 puffs every 4 hours if needed for wheezing or shortness of breath. 8.5 g 5    amitriptyline (Elavil) 10 mg tablet Take 1 tablet (10 mg) by mouth once daily at bedtime. (Patient not taking: Reported on 3/25/2024) 30 tablet 11    cetirizine (ZyrTEC) 10 mg capsule Take 1 capsule (10 mg) by mouth once daily at bedtime.      fluticasone (Flonase Allergy Relief) 50 mcg/actuation nasal spray once every 24 hours.      fluticasone propionate (ArmonAir Digihaler) 232 mcg/actuation aero powdr breath act w/sensor Inhale twice a day.      ibuprofen 200 mg tablet every 8 hours.      meloxicam (Mobic) 7.5 mg tablet Take by mouth.      naproxen sodium (Aleve) 220 mg tablet every 12 hours.      pantoprazole (ProtoNix) 40 mg EC tablet Take 1 tablet (40 mg) by mouth once daily in the morning. Take before meals.       No current facility-administered medications for this visit.      reports that she has never smoked. She has been exposed to tobacco smoke. She has never used smokeless tobacco. She reports that she does not drink alcohol and does not use drugs.  Bee pollen, Cefdinir, Ciprofloxacin, Iodinated contrast media, and Iodine  Primary hypertension    Vitals:    03/25/24 1116   BP: 116/61   Pulse: 51   Resp: 18   Temp: 37 °C (98.6 °F)   SpO2: 96%   Weight: 82.1 kg (181 lb)   Height: 1.651 m (5' 5\")   PainSc: 0-No pain      BMI:Body mass index is 30.12 kg/m².   General Cardiology:  General Appearance: Alert, oriented and in no acute distress.  HEENT: extra ocular movements intact (EOMI), pupils equal,  round, reactive to light and accommodation (PERRLA).  Carotid Upstroke: no bruit, normal.  Jugular Venous Distention (JVD): flat.  Chest: normal.  Lungs: Clear to auscultation,   Heart Sounds: no S3 or " S4, normal S1, S2, regular rate.  Murmur, Click, Gallop: mild systolic murmur.  Abdomen: no hepatomegaly, no masses felt, soft.  Extremities: no leg edema.  Peripheral pulses: 2 plus bilateral.  NEUROLOGY Cranial nerves II-XII grossly intact.     Patient Active Problem List   Diagnosis    Abnormality of right breast on screening mammogram    Allergic rhinitis    Chronic laryngitis    Chronic rhinitis    Anxiety    Arm paresthesia, right    Arthritis    Arthritis of both knees    Balance problem    Benign familial tremor    Bilateral impacted cerumen    Bilateral wheezing    Blood glucose elevated    Carpal tunnel syndrome of right wrist    Cervical radiculitis    Chronic hoarseness    Chronic pain    Chronic cough    Cough    Cutaneous candidiasis    DDD (degenerative disc disease), cervical    Depression    Duodenitis    Dupuytren's contracture    Elevated glucose    Hypertension    Fatigue    Fine motor impairment    Generalized abdominal pain    Gout    Hand weakness    Hearing loss of right ear due to cerumen impaction    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    History of left knee replacement    Status post total right knee replacement    Hyperlipidemia    Hypothyroidism    IBS (irritable bowel syndrome)    Intercostal neuralgia    Left sided sciatica    Left-sided low back pain with left-sided sciatica    Lumbar spine pain    Knee pain, left    Lumbar radiculopathy    Mixed conductive and sensorineural hearing loss of both ears    Myalgia    Myofascial pain    Headache    Neck pain    Numbness of lip    Numbness of tongue    Osteoarthritis of fingers of hands, bilateral    Lesion of false vocal cord    Lesion of vocal cord    Pain in knee region after total knee replacement (CMS/HCC)    Chronic right shoulder pain    Palpitations    Pes anserine bursitis    Pharyngoesophageal phase dysphagia    Postnasal drip    Proximal humerus fracture    Renal insufficiency    Restless legs    Right  hip pain    Internal impingement of right shoulder    RUE weakness    Sacroiliac joint dysfunction    Segmental and somatic dysfunction of cervical region    Thoracic myofascial strain    Bilateral tinnitus    Trochanteric bursitis of left hip    Vitamin D deficiency    Trigger middle finger of right hand    Trigger ring finger of right hand    MARTE (dyspnea on exertion)    Lung nodules    Mild obstructive sleep apnea    Osteoarthritis of right knee    Reactive airway disease    Right shoulder pain    Suspected sleep apnea    Transient ischemic attack    Arthritis of knee, left    Gastritis    Tinnitus    Acute left lumbar radiculopathy    Low back pain    Knee pain    Dysphagia    Paroxysmal atrial fibrillation (CMS/HCC)    Chronic obstructive pulmonary disease, unspecified COPD type (CMS/HCC)    Heart murmur       Problem List Items Addressed This Visit          Cardiac and Vasculature    Hypertension - Primary    Hyperlipidemia    Palpitations    Heart murmur     Other Visit Diagnoses       Essential hypertension        Relevant Medications    DILT- mg 24 hr capsule    Longstanding persistent atrial fibrillation (CMS/HCC)        Relevant Medications    DILT- mg 24 hr capsule    sotalol (Betapace) 120 mg tablet           78-year-old female patient with history of hypertension.  Patient currently in a sinus rhythm on a rate control education only taking aspirin.  Stable cardiac wise.  No chest pain or tightness.  1.  Hypertension: Continue current lisinopril as well as diltiazem for blood pressure  2.  Palpitation: Continue current diltiazem and sotalol for rate control.  No episode of atrial fibrillation.  3.  Heart murmur: Will order patient for echocardiogram.  Negative nuclear stress test for ischemia about a year ago.  Pt. care time is spent includes review of diagnostic tests, labs, radiographs, EKGs, old echoes, cardiac work-up and coordination of care. Assessment, impression and plans are  reflected in the note above as well as the orders.    Julian Tejada MD

## 2024-03-29 DIAGNOSIS — F41.9 ANXIETY: ICD-10-CM

## 2024-03-29 DIAGNOSIS — E78.5 HYPERLIPIDEMIA, UNSPECIFIED HYPERLIPIDEMIA TYPE: ICD-10-CM

## 2024-04-01 RX ORDER — VENLAFAXINE HYDROCHLORIDE 75 MG/1
75 CAPSULE, EXTENDED RELEASE ORAL DAILY
Qty: 90 CAPSULE | Refills: 1 | Status: SHIPPED | OUTPATIENT
Start: 2024-04-01

## 2024-04-01 RX ORDER — PRAVASTATIN SODIUM 40 MG/1
40 TABLET ORAL DAILY
Qty: 90 TABLET | Refills: 1 | Status: SHIPPED | OUTPATIENT
Start: 2024-04-01

## 2024-04-02 ENCOUNTER — OFFICE VISIT (OUTPATIENT)
Dept: DERMATOLOGY | Facility: CLINIC | Age: 79
End: 2024-04-02
Payer: MEDICARE

## 2024-04-02 DIAGNOSIS — D22.9 BENIGN NEVUS: ICD-10-CM

## 2024-04-02 DIAGNOSIS — L81.4 LENTIGO: ICD-10-CM

## 2024-04-02 DIAGNOSIS — L57.9 SKIN CHANGES DUE TO CHRONIC EXPOSURE TO NONIONIZING RADIATION: ICD-10-CM

## 2024-04-02 DIAGNOSIS — L82.1 SEBORRHEIC KERATOSIS: ICD-10-CM

## 2024-04-02 DIAGNOSIS — D18.01 ANGIOMA OF SKIN: Primary | ICD-10-CM

## 2024-04-02 PROCEDURE — 1160F RVW MEDS BY RX/DR IN RCRD: CPT | Performed by: NURSE PRACTITIONER

## 2024-04-02 PROCEDURE — 1157F ADVNC CARE PLAN IN RCRD: CPT | Performed by: NURSE PRACTITIONER

## 2024-04-02 PROCEDURE — 1159F MED LIST DOCD IN RCRD: CPT | Performed by: NURSE PRACTITIONER

## 2024-04-02 PROCEDURE — 99213 OFFICE O/P EST LOW 20 MIN: CPT | Performed by: NURSE PRACTITIONER

## 2024-04-02 PROCEDURE — 1036F TOBACCO NON-USER: CPT | Performed by: NURSE PRACTITIONER

## 2024-04-02 NOTE — PROGRESS NOTES
Subjective     Natividad Tena is a 78 y.o. female who presents for the following: Skin Check.     Review of Systems:  No other skin or systemic complaints other than what is documented elsewhere in the note.    The following portions of the chart were reviewed this encounter and updated as appropriate:   Tobacco  Allergies  Meds  Problems  Med Hx  Surg Hx         Skin Cancer History  No skin cancer on file.      Specialty Problems          Dermatology Problems    Cutaneous candidiasis        Objective   Well appearing patient in no apparent distress; mood and affect are within normal limits.    A full examination was performed including scalp, head, eyes, ears, nose, lips, neck, chest, axillae, abdomen, back, buttocks, bilateral upper extremities, bilateral lower extremities, hands, feet, fingers, toes, fingernails, and toenails. All findings within normal limits unless otherwise noted below.      Assessment/Plan   1. Angioma of skin  Scattered cherry-red papule(s).    A cherry hemangioma is a small macule (small, flat, smooth area) or papule (small, solid bump) formed from an overgrowth of tiny blood vessels in the skin. Cherry hemangiomas are characteristically red or purplish in color. They often first appear in middle adulthood and usually increase in number with age. Cherry hemangiomas are noncancerous (benign) and are common in adults.    The present appearance of the lesion is not worrisome but it should continue to be observed and testing/treatment may be warranted if change occurs.    2. Benign nevus  Scattered, uniform and benign-appearing, regular brown melanocytic papules and macules.    The present appearance of the lesion is not worrisome but it should continue to be observed and testing/treatment may be warranted if change occurs.    3. Seborrheic keratosis  Stuck on verrucous, tan-brown papules and plaques.      Seborrheic keratoses are common noncancerous (benign) growths of unknown cause seen  in adults due to a thickening of an area of the top skin layer. Seborrheic keratoses may appear as if they are stuck on to the skin. They have distinct borders, and they may appear as papules (small, solid bumps) or plaques (solid, raised patches that are bigger than a thumbnail). They may be the same color as your skin, or they may be pink, light brown, darker brown, or very dark brown, or sometimes may appear black.    There is no way to prevent new seborrheic keratoses from forming. Seborrheic keratoses can be removed, but removal is considered a cosmetic issue and is usually not covered by insurance.    PLAN  No treatment is needed unless there is irritation from clothing, such as itching or bleeding.  2.   Some lotions containing alpha hydroxy acids, salicylic acid, or urea may make the areas feel smoother with regular use but will not eliminate them.    4. Lentigo  Scattered tan macules in sun-exposed areas.    A solar lentigo (plural, solar lentigines), also known as a sun-induced freckle or senile lentigo, is a dark (hyperpigmented) lesion caused by natural or artificial ultraviolet (UV) light. Solar lentigines may be single or multiple. This type of lentigo is different from a simple lentigo (lentigo simplex) because it is caused by exposure to UV light. Solar lentigines are benign, but they do indicate excessive sun exposure, a risk factor for the development of skin cancer.    To prevent solar lentigines, avoid exposure to sunlight in midday (10 AM to 3 PM), wear sun-protective clothing (tightly woven clothes and hats), and apply sunscreen (SPF 30 UVA and UVB block).    The present appearance of the lesion is not worrisome but it should continue to be observed and testing/treatment may be warranted if change occurs.    5. Skin changes due to chronic exposure to nonionizing radiation  Actinic changes in the form of freckles, lentigines and hyper/hypopigmentation     ABCDEs of melanoma and atypical moles  were discussed with the patient.    Patient was instructed to perform monthly self skin examination.  We recommended that the patient have regular full skin exams given an increased risk of subsequent skin cancers.    The patient was instructed to use sun protective behaviors including use of broad spectrum sunscreens and sun protective clothing to reduce risk of skin cancers.    Warning signs of non-melanoma skin cancer discussed.        Return to clinic in 1-2 years for skin check/follow up or sooner if needed

## 2024-04-02 NOTE — PATIENT INSTRUCTIONS

## 2024-04-03 ENCOUNTER — HOSPITAL ENCOUNTER (OUTPATIENT)
Dept: CARDIOLOGY | Facility: HOSPITAL | Age: 79
Discharge: HOME | End: 2024-04-03
Payer: MEDICARE

## 2024-04-03 DIAGNOSIS — I10 ESSENTIAL HYPERTENSION: ICD-10-CM

## 2024-04-03 DIAGNOSIS — I48.11 LONGSTANDING PERSISTENT ATRIAL FIBRILLATION (MULTI): ICD-10-CM

## 2024-04-03 DIAGNOSIS — E78.2 MIXED HYPERLIPIDEMIA: ICD-10-CM

## 2024-04-03 DIAGNOSIS — R00.2 PALPITATIONS: ICD-10-CM

## 2024-04-03 DIAGNOSIS — R01.1 HEART MURMUR: ICD-10-CM

## 2024-04-03 DIAGNOSIS — I10 PRIMARY HYPERTENSION: ICD-10-CM

## 2024-04-03 PROCEDURE — 93306 TTE W/DOPPLER COMPLETE: CPT | Performed by: INTERNAL MEDICINE

## 2024-04-03 PROCEDURE — 93306 TTE W/DOPPLER COMPLETE: CPT

## 2024-04-04 LAB
AORTIC VALVE MEAN GRADIENT: 4 MMHG
AORTIC VALVE PEAK VELOCITY: 1.57 M/S
AV PEAK GRADIENT: 9.9 MMHG
EJECTION FRACTION APICAL 4 CHAMBER: 60.2
LEFT VENTRICLE INTERNAL DIMENSION DIASTOLE: 3.91 CM (ref 3.5–6)
LEFT VENTRICULAR OUTFLOW TRACT DIAMETER: 1.8 CM
MITRAL VALVE E/A RATIO: 1.11
RIGHT VENTRICLE FREE WALL PEAK S': 13.2 CM/S
RIGHT VENTRICLE PEAK SYSTOLIC PRESSURE: 48.7 MMHG
TRICUSPID ANNULAR PLANE SYSTOLIC EXCURSION: 2.5 CM

## 2024-05-17 ENCOUNTER — HOSPITAL ENCOUNTER (EMERGENCY)
Facility: HOSPITAL | Age: 79
Discharge: HOME | End: 2024-05-17
Payer: MEDICARE

## 2024-05-17 ENCOUNTER — HOSPITAL ENCOUNTER (OUTPATIENT)
Dept: RADIOLOGY | Facility: CLINIC | Age: 79
Discharge: HOME | End: 2024-05-17
Payer: MEDICARE

## 2024-05-17 VITALS
DIASTOLIC BLOOD PRESSURE: 70 MMHG | BODY MASS INDEX: 30.6 KG/M2 | OXYGEN SATURATION: 98 % | TEMPERATURE: 98.1 F | RESPIRATION RATE: 17 BRPM | SYSTOLIC BLOOD PRESSURE: 140 MMHG | HEIGHT: 65 IN | HEART RATE: 61 BPM | WEIGHT: 183.64 LBS

## 2024-05-17 DIAGNOSIS — M25.512 LEFT SHOULDER PAIN, UNSPECIFIED CHRONICITY: ICD-10-CM

## 2024-05-17 DIAGNOSIS — S42.002A FRACTURE OF UNSPECIFIED PART OF LEFT CLAVICLE, INITIAL ENCOUNTER FOR CLOSED FRACTURE: Primary | ICD-10-CM

## 2024-05-17 PROCEDURE — 99283 EMERGENCY DEPT VISIT LOW MDM: CPT

## 2024-05-17 PROCEDURE — 73030 X-RAY EXAM OF SHOULDER: CPT | Mod: LT

## 2024-05-17 PROCEDURE — 73030 X-RAY EXAM OF SHOULDER: CPT | Mod: LEFT SIDE | Performed by: RADIOLOGY

## 2024-05-17 RX ORDER — OXYCODONE AND ACETAMINOPHEN 5; 325 MG/1; MG/1
1 TABLET ORAL EVERY 6 HOURS PRN
Qty: 5 TABLET | Refills: 0 | Status: SHIPPED | OUTPATIENT
Start: 2024-05-17 | End: 2024-05-20

## 2024-05-17 ASSESSMENT — PAIN - FUNCTIONAL ASSESSMENT: PAIN_FUNCTIONAL_ASSESSMENT: 0-10

## 2024-05-17 ASSESSMENT — PAIN SCALES - GENERAL
PAINLEVEL_OUTOF10: 4
PAINLEVEL_OUTOF10: 4

## 2024-05-17 NOTE — DISCHARGE INSTRUCTIONS
Thank you for visiting [facility] emergency department.  You have been diagnosed with a clavicle fracture.  We recommend you follow-up with orthopedics outpatient within the next 1 to 2 days.  Take Tylenol as needed for pain and Percocet for any severe pain.  Return to the emergency department anytime with any new or worsening symptoms.

## 2024-05-17 NOTE — ED PROVIDER NOTES
HPI   Chief Complaint   Patient presents with    Shoulder Injury     Patient fell on left shoulder this morning getting out of bed. Patient has arm in sling, patient has limited movement with her arm        Patient is a 78-year-old female presenting to the emergency department for evaluation of left shoulder pain.  Patient states she was getting out of bed this morning when she fell on her left shoulder.  She states she did not hit her head and did not lose consciousness.  She states she had immediate pain in the left shoulder and had pain with any movement of it and therefore went to urgent care.  She states she had x-rays taken at urgent care and they prompted her to come to the emergency department for further evaluation.  She denies chest pain, shortness of breath, fever, chills, nausea, vomiting abdominal pain, numbness, tingling.                          No data recorded                     Patient History   Past Medical History:   Diagnosis Date    A-fib (Multi) 03/04/2023    Acute maxillary sinusitis, unspecified 01/25/2016    Acute maxillary sinusitis    Allergic rhinitis, unspecified 12/07/2013    Allergic rhinitis    Anemia, unspecified 12/07/2013    Anemia    Cough, unspecified 12/15/2015    Cough    Cough, unspecified 07/23/2014    Cough    Cough, unspecified 03/23/2015    Cough    MARTE (dyspnea on exertion) 09/05/2023    Dysphonia     Dysphonia    Gastro-esophageal reflux disease without esophagitis 12/07/2013    Esophageal reflux    Generalized abdominal pain 07/31/2014    Generalized abdominal pain    Hyperlipidemia 03/04/2023    Hypertension 03/04/2023    Other cervical disc displacement, unspecified cervical region     Herniated disc, cervical    Palpitations 03/04/2023    Paroxysmal atrial fibrillation (Multi) 09/05/2023    Personal history of other diseases of the respiratory system 12/03/2014    History of allergic rhinitis    Personal history of other diseases of the respiratory system  04/10/2014    History of allergic rhinitis    Personal history of transient ischemic attack (TIA), and cerebral infarction without residual deficits     History of transient cerebral ischemia    Unspecified fracture of shaft of humerus, right arm, initial encounter for closed fracture 2017    Fracture of humerus, right, closed     Past Surgical History:   Procedure Laterality Date    BLADDER SURGERY  2014    Bladder Surgery     SECTION, CLASSIC  2014     Section    EYE SURGERY  2014    Eye Surgery    HYSTERECTOMY  2014    Hysterectomy     Family History   Problem Relation Name Age of Onset    Hypertension Mother      Cancer Mother      Cancer Father      Heart attack Father      Hypertension Brother       Social History     Tobacco Use    Smoking status: Never     Passive exposure: Past    Smokeless tobacco: Never   Vaping Use    Vaping status: Never Used   Substance Use Topics    Alcohol use: Never    Drug use: Never       Physical Exam   ED Triage Vitals [24 1257]   Temperature Heart Rate Respirations BP   36.6 °C (97.9 °F) 57 16 145/70      Pulse Ox Temp Source Heart Rate Source Patient Position   98 % Oral Monitor Sitting      BP Location FiO2 (%)     Right arm --       Physical Exam  Vitals and nursing note reviewed.   Constitutional:       General: She is not in acute distress.     Appearance: Normal appearance. She is not ill-appearing or toxic-appearing.   HENT:      Head: Normocephalic and atraumatic.      Nose: Nose normal.      Mouth/Throat:      Mouth: Mucous membranes are moist.   Eyes:      Pupils: Pupils are equal, round, and reactive to light.   Cardiovascular:      Pulses: Normal pulses.      Heart sounds: Normal heart sounds.      Comments: 2+ equal radial pulses bilaterally  Pulmonary:      Effort: Pulmonary effort is normal.      Breath sounds: Normal breath sounds.   Musculoskeletal:         General: Tenderness (Tenderness to palpation in the  left distal clavicle with pain with shoulder movement on the left) and deformity (Deformity felt when clavicles were palpated simultaneously) present.   Skin:     General: Skin is warm and dry.      Findings: No bruising or erythema.      Comments: No tenting of the skin   Neurological:      Mental Status: She is alert.         ED Course & MDM   Diagnoses as of 05/25/24 1658   Fracture of unspecified part of left clavicle, initial encounter for closed fracture       Medical Decision Making  **Disclaimer parts of this chart have been completed using voice recognition software. Please excuse any errors of transcription.     Evaluated this patient independently and my supervising physician was available for consultation.    HPI: Detailed above.    Exam: A medically appropriate exam performed, outlined above, given the known history and presentation.    History obtained from: Patient    EMERGENCY DEPARTMENT COURSE and DIFFERENTIAL DIAGNOSIS/MDM:  Patient is a 78-year-old female presenting to the emergency department for evaluation of left shoulder pain.  On physical exam vital signs remarkable for systolic hypertension but otherwise stable and patient is in no acute distress.  She is in a sling for comfort which was given to her by urgent care.  She is some tenderness to palpation in the left clavicle extending into the shoulder joint with pain with range of motion of the shoulder.  There is no tenting of the skin.  No significant ecchymosis or swelling.  Patient had x-rays in urgent care which showed comminuted fracture involving the acromial end of the left clavicle.  Patient was advised to come to the emergency department for further evaluation.  At this time do not feel repeat x-rays are necessary.  Patient was placed in a sling and given Percocet for pain.  She will follow-up with orthopedics outpatient within the next 1 to 2 days.  She will return to the emergency department anytime with any new or worsening  "symptoms.    Vitals:    Vitals:    05/17/24 1257 05/17/24 1301 05/17/24 1420   BP: 145/70  140/70   BP Location: Right arm  Right arm   Patient Position: Sitting  Sitting   Pulse: 57  61   Resp: 16  17   Temp: 36.6 °C (97.9 °F)  36.7 °C (98.1 °F)   TempSrc: Oral  Oral   SpO2: 98% 98% 98%   Weight: 83.3 kg (183 lb 10.3 oz)     Height: 1.651 m (5' 5\")       History Limited by:    None    Independent history obtained from:    None    External records reviewed:    Outpatient Labs and/or studies x-ray imaging and results from urgent care today showing the patient's comminuted fracture of the left clavicle    Diagnostics interpreted by me:    Xrays - see my independent interpretation in MDM    Discussions with other clinicians:    None    Chronic conditions impacting care:    None    Social determinants of health affecting care:    None    Diagnostic tests considered but not performed: None    ED Medications managed:    Medications - No data to display    Prescription drugs considered:    Pain Medications Percocet    Screenings:                Procedure  Splint Application    Performed by: Melinda Galdamez PA-C  Authorized by: Melinda Galdamez PA-C    Consent:     Consent obtained:  Verbal    Consent given by:  Patient    Risks, benefits, and alternatives were discussed: yes      Risks discussed:  Discoloration, numbness, pain and swelling    Alternatives discussed:  No treatment, delayed treatment and alternative treatment  Universal protocol:     Procedure explained and questions answered to patient or proxy's satisfaction: yes      Imaging studies available: yes      Immediately prior to procedure a time out was called: yes      Patient identity confirmed:  Arm band and verbally with patient  Pre-procedure details:     Distal neurologic exam:  Normal    Distal perfusion: distal pulses strong and brisk capillary refill    Procedure details:     Location:  Shoulder    Shoulder location:  L shoulder    Supplies:  " Sling  Post-procedure details:     Distal neurologic exam:  Unchanged    Distal perfusion: unchanged      Procedure completion:  Tolerated well, no immediate complications    Post-procedure imaging: not applicable    Comments:      Sling placed by urgent care prior to arrival and assessed by myself          Melinda Galdamez PA-C  05/25/24 8793

## 2024-05-28 ENCOUNTER — OFFICE VISIT (OUTPATIENT)
Dept: PRIMARY CARE | Facility: CLINIC | Age: 79
End: 2024-05-28
Payer: MEDICARE

## 2024-05-28 VITALS
SYSTOLIC BLOOD PRESSURE: 106 MMHG | DIASTOLIC BLOOD PRESSURE: 50 MMHG | HEIGHT: 65 IN | RESPIRATION RATE: 17 BRPM | WEIGHT: 185.4 LBS | HEART RATE: 73 BPM | BODY MASS INDEX: 30.89 KG/M2 | OXYGEN SATURATION: 95 %

## 2024-05-28 DIAGNOSIS — E03.9 HYPOTHYROIDISM, UNSPECIFIED TYPE: ICD-10-CM

## 2024-05-28 PROCEDURE — 1036F TOBACCO NON-USER: CPT | Performed by: STUDENT IN AN ORGANIZED HEALTH CARE EDUCATION/TRAINING PROGRAM

## 2024-05-28 PROCEDURE — 99214 OFFICE O/P EST MOD 30 MIN: CPT | Performed by: STUDENT IN AN ORGANIZED HEALTH CARE EDUCATION/TRAINING PROGRAM

## 2024-05-28 PROCEDURE — 3078F DIAST BP <80 MM HG: CPT | Performed by: STUDENT IN AN ORGANIZED HEALTH CARE EDUCATION/TRAINING PROGRAM

## 2024-05-28 PROCEDURE — 1160F RVW MEDS BY RX/DR IN RCRD: CPT | Performed by: STUDENT IN AN ORGANIZED HEALTH CARE EDUCATION/TRAINING PROGRAM

## 2024-05-28 PROCEDURE — 3074F SYST BP LT 130 MM HG: CPT | Performed by: STUDENT IN AN ORGANIZED HEALTH CARE EDUCATION/TRAINING PROGRAM

## 2024-05-28 PROCEDURE — 1159F MED LIST DOCD IN RCRD: CPT | Performed by: STUDENT IN AN ORGANIZED HEALTH CARE EDUCATION/TRAINING PROGRAM

## 2024-05-28 PROCEDURE — 1157F ADVNC CARE PLAN IN RCRD: CPT | Performed by: STUDENT IN AN ORGANIZED HEALTH CARE EDUCATION/TRAINING PROGRAM

## 2024-05-28 PROCEDURE — 1123F ACP DISCUSS/DSCN MKR DOCD: CPT | Performed by: STUDENT IN AN ORGANIZED HEALTH CARE EDUCATION/TRAINING PROGRAM

## 2024-05-28 RX ORDER — LEVOTHYROXINE SODIUM 75 UG/1
75 TABLET ORAL DAILY
Qty: 90 TABLET | Refills: 1 | Status: SHIPPED | OUTPATIENT
Start: 2024-05-28

## 2024-05-28 ASSESSMENT — ENCOUNTER SYMPTOMS
DEPRESSION: 1
LOSS OF SENSATION IN FEET: 1
OCCASIONAL FEELINGS OF UNSTEADINESS: 1

## 2024-05-28 ASSESSMENT — PATIENT HEALTH QUESTIONNAIRE - PHQ9
1. LITTLE INTEREST OR PLEASURE IN DOING THINGS: NOT AT ALL
2. FEELING DOWN, DEPRESSED OR HOPELESS: NOT AT ALL
SUM OF ALL RESPONSES TO PHQ9 QUESTIONS 1 AND 2: 0

## 2024-08-21 ENCOUNTER — OFFICE VISIT (OUTPATIENT)
Dept: PULMONOLOGY | Facility: CLINIC | Age: 79
End: 2024-08-21
Payer: MEDICARE

## 2024-08-21 VITALS
SYSTOLIC BLOOD PRESSURE: 103 MMHG | DIASTOLIC BLOOD PRESSURE: 69 MMHG | BODY MASS INDEX: 30.32 KG/M2 | HEART RATE: 65 BPM | OXYGEN SATURATION: 96 % | WEIGHT: 182 LBS | HEIGHT: 65 IN

## 2024-08-21 DIAGNOSIS — J31.0 CHRONIC RHINITIS: ICD-10-CM

## 2024-08-21 DIAGNOSIS — R91.8 LUNG NODULES: ICD-10-CM

## 2024-08-21 DIAGNOSIS — J45.20 MILD INTERMITTENT REACTIVE AIRWAY DISEASE WITHOUT COMPLICATION (HHS-HCC): Primary | ICD-10-CM

## 2024-08-21 DIAGNOSIS — R04.0 EPISTAXIS: ICD-10-CM

## 2024-08-21 DIAGNOSIS — R09.82 POST-NASAL DRAINAGE: ICD-10-CM

## 2024-08-21 PROBLEM — R91.1 LUNG NODULE: Status: ACTIVE | Noted: 2023-09-05

## 2024-08-21 PROCEDURE — 3078F DIAST BP <80 MM HG: CPT | Performed by: NURSE PRACTITIONER

## 2024-08-21 PROCEDURE — 1123F ACP DISCUSS/DSCN MKR DOCD: CPT | Performed by: NURSE PRACTITIONER

## 2024-08-21 PROCEDURE — 1160F RVW MEDS BY RX/DR IN RCRD: CPT | Performed by: NURSE PRACTITIONER

## 2024-08-21 PROCEDURE — 99214 OFFICE O/P EST MOD 30 MIN: CPT | Performed by: NURSE PRACTITIONER

## 2024-08-21 PROCEDURE — 1159F MED LIST DOCD IN RCRD: CPT | Performed by: NURSE PRACTITIONER

## 2024-08-21 PROCEDURE — 3074F SYST BP LT 130 MM HG: CPT | Performed by: NURSE PRACTITIONER

## 2024-08-21 PROCEDURE — 1036F TOBACCO NON-USER: CPT | Performed by: NURSE PRACTITIONER

## 2024-08-21 PROCEDURE — 1125F AMNT PAIN NOTED PAIN PRSNT: CPT | Performed by: NURSE PRACTITIONER

## 2024-08-21 PROCEDURE — 1157F ADVNC CARE PLAN IN RCRD: CPT | Performed by: NURSE PRACTITIONER

## 2024-08-21 ASSESSMENT — ENCOUNTER SYMPTOMS
WHEEZING: 0
SLEEP DISTURBANCE: 1
EYE REDNESS: 0
ROS GI COMMENTS: DENIES ACID REFLUX
BACK PAIN: 0
UNEXPECTED WEIGHT CHANGE: 0
PALPITATIONS: 0
BRUISES/BLEEDS EASILY: 1
COUGH: 1
ABDOMINAL PAIN: 0
SHORTNESS OF BREATH: 0
VOMITING: 0
HEADACHES: 0
SINUS PAIN: 0
DIZZINESS: 1
STRIDOR: 0
NERVOUS/ANXIOUS: 0
VOICE CHANGE: 0
CHEST TIGHTNESS: 0
AGITATION: 0
SINUS PRESSURE: 0
EYE ITCHING: 0
WEAKNESS: 0
FATIGUE: 1
CHILLS: 0
ACTIVITY CHANGE: 0
SORE THROAT: 0
TROUBLE SWALLOWING: 0
TREMORS: 1
RHINORRHEA: 1
DIARRHEA: 0
MYALGIAS: 0
FEVER: 0
JOINT SWELLING: 0
APPETITE CHANGE: 0
ARTHRALGIAS: 0
NAUSEA: 0

## 2024-08-21 ASSESSMENT — PATIENT HEALTH QUESTIONNAIRE - PHQ9
10. IF YOU CHECKED OFF ANY PROBLEMS, HOW DIFFICULT HAVE THESE PROBLEMS MADE IT FOR YOU TO DO YOUR WORK, TAKE CARE OF THINGS AT HOME, OR GET ALONG WITH OTHER PEOPLE: NOT DIFFICULT AT ALL
SUM OF ALL RESPONSES TO PHQ9 QUESTIONS 1 & 2: 1
1. LITTLE INTEREST OR PLEASURE IN DOING THINGS: NOT AT ALL
2. FEELING DOWN, DEPRESSED OR HOPELESS: SEVERAL DAYS

## 2024-08-21 ASSESSMENT — PAIN SCALES - GENERAL: PAINLEVEL: 1

## 2024-08-21 ASSESSMENT — LIFESTYLE VARIABLES: HOW MANY STANDARD DRINKS CONTAINING ALCOHOL DO YOU HAVE ON A TYPICAL DAY: PATIENT DOES NOT DRINK

## 2024-08-21 NOTE — PATIENT INSTRUCTIONS
"Today we discussed how you have been feeling since your last visit.  Happy to hear your breathing/cough is under good control.    -Continue Albuterol Inhaler; 2 puffs every 4-6 hours as needed for shortness of breath. You can also take this 10-15 minutes prior to exertional activity to help \"prime\" your lungs.  -Continue Fluticasone inhaler as needed for cough/shortness of breath. Rinse mouth after use.  -Resume taking daily cetirizine 10 mg daily  -Referral to ENT for your persistent runny nose, post nasal drainage and bad nose bleeds this summer  -Continue following with sleep medicine for your sleep apnea management  -Reordering Chest CT to be completed; monitoring 6 mm nodule seen on CT scan on 6/2023. I will contact you with results.    Thank you for visiting the pulmonary clinic today! It was a pleasure to participate in your care.  Please return to clinic 1 year or sooner if needed.    Angel Tejeda, CNP  My Office Number: [phone]   CT Scheduling: [phone]  PFT/Follow Up Visit Scheduling: [phone]  My Nurse: LINDSAY Subramanian    To reach the nurse, Marilynn Nguyen RN, please call ([phone]) Marilynn has a secure voice mail account if you want to leave a message.    **For immediate needs such as medication issues/refills, active sick symptoms/medical concerns; I ask that you please call the office and speak to the pulmonary nurse. MyChart messages do not come directly to me. There can sometimes be a delay before I am aware of any messages that were sent. Thank you.**      "

## 2024-08-29 ENCOUNTER — APPOINTMENT (OUTPATIENT)
Dept: RADIOLOGY | Facility: HOSPITAL | Age: 79
End: 2024-08-29
Payer: MEDICARE

## 2024-08-29 ENCOUNTER — HOSPITAL ENCOUNTER (OUTPATIENT)
Dept: RADIOLOGY | Facility: HOSPITAL | Age: 79
Discharge: HOME | End: 2024-08-29
Payer: MEDICARE

## 2024-08-29 DIAGNOSIS — R91.8 LUNG NODULES: ICD-10-CM

## 2024-08-29 PROCEDURE — 71250 CT THORAX DX C-: CPT

## 2024-09-25 ENCOUNTER — APPOINTMENT (OUTPATIENT)
Dept: CARDIOLOGY | Facility: CLINIC | Age: 79
End: 2024-09-25
Payer: MEDICARE

## 2024-09-25 VITALS
WEIGHT: 189 LBS | DIASTOLIC BLOOD PRESSURE: 60 MMHG | HEART RATE: 73 BPM | HEIGHT: 65 IN | OXYGEN SATURATION: 98 % | TEMPERATURE: 98 F | SYSTOLIC BLOOD PRESSURE: 110 MMHG | RESPIRATION RATE: 16 BRPM | BODY MASS INDEX: 31.49 KG/M2

## 2024-09-25 DIAGNOSIS — R01.1 HEART MURMUR: ICD-10-CM

## 2024-09-25 DIAGNOSIS — E78.2 MIXED HYPERLIPIDEMIA: Primary | ICD-10-CM

## 2024-09-25 DIAGNOSIS — E78.5 HYPERLIPIDEMIA, UNSPECIFIED HYPERLIPIDEMIA TYPE: ICD-10-CM

## 2024-09-25 DIAGNOSIS — R06.09 DOE (DYSPNEA ON EXERTION): ICD-10-CM

## 2024-09-25 DIAGNOSIS — R00.2 PALPITATIONS: ICD-10-CM

## 2024-09-25 DIAGNOSIS — I10 PRIMARY HYPERTENSION: ICD-10-CM

## 2024-09-25 DIAGNOSIS — F41.9 ANXIETY: ICD-10-CM

## 2024-09-25 PROCEDURE — 3074F SYST BP LT 130 MM HG: CPT | Performed by: INTERNAL MEDICINE

## 2024-09-25 PROCEDURE — 3078F DIAST BP <80 MM HG: CPT | Performed by: INTERNAL MEDICINE

## 2024-09-25 PROCEDURE — 1157F ADVNC CARE PLAN IN RCRD: CPT | Performed by: INTERNAL MEDICINE

## 2024-09-25 PROCEDURE — 1126F AMNT PAIN NOTED NONE PRSNT: CPT | Performed by: INTERNAL MEDICINE

## 2024-09-25 PROCEDURE — 1123F ACP DISCUSS/DSCN MKR DOCD: CPT | Performed by: INTERNAL MEDICINE

## 2024-09-25 PROCEDURE — 1159F MED LIST DOCD IN RCRD: CPT | Performed by: INTERNAL MEDICINE

## 2024-09-25 PROCEDURE — 99214 OFFICE O/P EST MOD 30 MIN: CPT | Performed by: INTERNAL MEDICINE

## 2024-09-25 PROCEDURE — 1160F RVW MEDS BY RX/DR IN RCRD: CPT | Performed by: INTERNAL MEDICINE

## 2024-09-25 PROCEDURE — 1036F TOBACCO NON-USER: CPT | Performed by: INTERNAL MEDICINE

## 2024-09-25 RX ORDER — LISINOPRIL 20 MG/1
20 TABLET ORAL 2 TIMES DAILY
Qty: 180 TABLET | Refills: 3 | Status: SHIPPED | OUTPATIENT
Start: 2024-09-25 | End: 2025-09-20

## 2024-09-25 RX ORDER — VENLAFAXINE HYDROCHLORIDE 75 MG/1
75 CAPSULE, EXTENDED RELEASE ORAL DAILY
Qty: 90 CAPSULE | Refills: 1 | Status: SHIPPED | OUTPATIENT
Start: 2024-09-25

## 2024-09-25 RX ORDER — PRAVASTATIN SODIUM 40 MG/1
40 TABLET ORAL DAILY
Qty: 90 TABLET | Refills: 1 | Status: SHIPPED | OUTPATIENT
Start: 2024-09-25

## 2024-09-25 ASSESSMENT — PATIENT HEALTH QUESTIONNAIRE - PHQ9
SUM OF ALL RESPONSES TO PHQ9 QUESTIONS 1 AND 2: 0
1. LITTLE INTEREST OR PLEASURE IN DOING THINGS: NOT AT ALL
2. FEELING DOWN, DEPRESSED OR HOPELESS: NOT AT ALL

## 2024-09-25 ASSESSMENT — ENCOUNTER SYMPTOMS
LOSS OF SENSATION IN FEET: 0
DEPRESSION: 0
OCCASIONAL FEELINGS OF UNSTEADINESS: 0

## 2024-09-25 ASSESSMENT — LIFESTYLE VARIABLES
HAVE YOU OR SOMEONE ELSE BEEN INJURED AS A RESULT OF YOUR DRINKING: NO
HOW MANY STANDARD DRINKS CONTAINING ALCOHOL DO YOU HAVE ON A TYPICAL DAY: PATIENT DOES NOT DRINK
HOW OFTEN DO YOU HAVE SIX OR MORE DRINKS ON ONE OCCASION: NEVER
SKIP TO QUESTIONS 9-10: 1
HOW OFTEN DO YOU HAVE A DRINK CONTAINING ALCOHOL: NEVER
HAS A RELATIVE, FRIEND, DOCTOR, OR ANOTHER HEALTH PROFESSIONAL EXPRESSED CONCERN ABOUT YOUR DRINKING OR SUGGESTED YOU CUT DOWN: NO
AUDIT TOTAL SCORE: 0
AUDIT-C TOTAL SCORE: 0

## 2024-09-25 ASSESSMENT — PAIN SCALES - GENERAL: PAINLEVEL: 0-NO PAIN

## 2024-09-25 NOTE — PROGRESS NOTES
Subjective   Chief Complaint   Patient presents with    Follow-up     Mrs\Ms. Tena is present for her 6 Month Follow up with Dr. Tejada, pt will like to discuss results from testing done in Aug        78-year-old female patient with a multiple comorbid condition history of hypertension, hyperlipidemia, palpitation fluttering.  Currently on CPAP machine.  Also on sotalol and Cardizem.  No active angina CHF sign symptoms.  Patient had a negative nuclear stress test with ischemia in February 2023.  History of bladder surgery in the past has had a hysterectomy in the past.  Patient had a anterior echocardiogram done ordering the March of this year essentially showed underlying normal ejection fraction with diastolic dysfunction.  Mild to moderate tricuspid regurgitation seen with aortic valve is sclerotic.  Patient is CBC done last year was unremarkable.  Comprehensive metabolic panel also was done year ago was unremarkable.  Lipid profile total cholesterol 188, HDL is 85, LDL is 91 with a triglyceride 58.    Past Medical History:   Diagnosis Date    A-fib (Multi) 03/04/2023    Acute maxillary sinusitis, unspecified 01/25/2016    Acute maxillary sinusitis    Allergic rhinitis, unspecified 12/07/2013    Allergic rhinitis    Anemia, unspecified 12/07/2013    Anemia    Cough, unspecified 12/15/2015    Cough    Cough, unspecified 07/23/2014    Cough    Cough, unspecified 03/23/2015    Cough    MARTE (dyspnea on exertion) 09/05/2023    Dysphonia     Dysphonia    Gastro-esophageal reflux disease without esophagitis 12/07/2013    Esophageal reflux    Generalized abdominal pain 07/31/2014    Generalized abdominal pain    Hyperlipidemia 03/04/2023    Hypertension 03/04/2023    Other cervical disc displacement, unspecified cervical region     Herniated disc, cervical    Palpitations 03/04/2023    Paroxysmal atrial fibrillation (Multi) 09/05/2023    Personal history of other diseases of the respiratory system 12/03/2014    History of  allergic rhinitis    Personal history of other diseases of the respiratory system 04/10/2014    History of allergic rhinitis    Personal history of transient ischemic attack (TIA), and cerebral infarction without residual deficits     History of transient cerebral ischemia    Unspecified fracture of shaft of humerus, right arm, initial encounter for closed fracture 2017    Fracture of humerus, right, closed     Past Surgical History:   Procedure Laterality Date    BLADDER SURGERY  2014    Bladder Surgery     SECTION, CLASSIC  2014     Section    EYE SURGERY  2014    Eye Surgery    HYSTERECTOMY  2014    Hysterectomy     No relevant family history has been documented for this patient.  Current Outpatient Medications   Medication Sig Dispense Refill    acetaminophen (Tylenol) 500 mg tablet take 2 tablets every 8 hours for 5 days, after 5 days take 2 tablets every 8 hours only as needed for pain      aspirin 81 mg EC tablet Take 1 tablet (81 mg) by mouth every other day.      calcium carbonate 600 mg calcium (1,500 mg) tablet Take 1 tablet (1,500 mg) by mouth 2 times a day.      cetirizine (ZyrTEC) 10 mg capsule Take 1 capsule (10 mg) by mouth once daily at bedtime.      cholecalciferol (Vitamin D-3) 50 MCG (2000 UT) tablet Take 1 tablet (2,000 Units) by mouth once daily.      DILT- mg 24 hr capsule Take 1 capsule (180 mg) by mouth once daily. 90 capsule 3    diphenhydrAMINE-acetaminophen (Tylenol PM Extra Strength)  mg per tablet Take by mouth.      fluticasone (Flonase Allergy Relief) 50 mcg/actuation nasal spray once every 24 hours.      fluticasone propionate (ArmonAir Digihaler) 232 mcg/actuation aero powdr breath act w/sensor Inhale twice a day.      ibuprofen 200 mg tablet every 8 hours.      levothyroxine (Synthroid, Levoxyl) 75 mcg tablet Take 1 tablet (75 mcg) by mouth once daily. 90 tablet 1    lisinopril 20 mg tablet Take 1 tablet (20 mg) by mouth 2  times a day.      loperamide (Imodium) 2 mg capsule Take by mouth.      lutein 10 mg tablet once every 24 hours.      magnesium 250 mg tablet Take by mouth.      moisturizing mouth (Biotene Oral Dry Mouth) solution Take 2 sprays by mouth if needed (dry mouth). 44.3 mL 1    naproxen sodium (Aleve) 220 mg tablet every 12 hours.      nitroglycerin (Nitrostat) 0.4 mg SL tablet Place 1 tablet (0.4 mg) under the tongue. As directed may be repeated every 5 minutes up to a maximum of 3 doses in a 15 minute period.      pravastatin (Pravachol) 40 mg tablet TAKE ONE TABLET BY MOUTH EVERY DAY 90 tablet 1    sotalol (Betapace) 120 mg tablet Take 1 tablet (120 mg) by mouth 2 times a day. 180 tablet 3    venlafaxine XR (Effexor-XR) 75 mg 24 hr capsule take 1 capsule by mouth once daily 90 capsule 1    vit A,C and E-lutein-minerals (Ocuvite with Lutein) 300 mcg-200 mg-27 mg-2 mg tablet Take 1 tablet by mouth once daily.      VITAMIN B COMPLEX ORAL Take 1 tablet by mouth once daily.      albuterol (ProAir HFA) 90 mcg/actuation inhaler Inhale 2 puffs every 4 hours if needed for wheezing or shortness of breath. 8.5 g 5    pantoprazole (ProtoNix) 40 mg EC tablet Take 1 tablet (40 mg) by mouth once daily in the morning. Take before meals.       No current facility-administered medications for this visit.      reports that she has never smoked. She has been exposed to tobacco smoke. She has never used smokeless tobacco. She reports that she does not currently use alcohol. She reports that she does not currently use drugs.  Allergies:  Bee pollen, Cefdinir, Ciprofloxacin, Iodinated contrast media, and Iodine    ROS: See HPI  CONSTITUTIONAL: Chills- none. Fever- none. Weight change appropriate for age.  HEENT: Headache- Negative.  Change in vision- none.  Ear pain- none. Nasal congestion- none. Post-nasal drip-none.  Sore throat-none.  CARDIOLOGY: Chest pain- none.  Leg edema-trace.  Murmurs-soft systolic.  Palpitation-  "none.  RESPIRATORY: Denies any shortness of breath.  GI: Abdominal pain- none.  Change in bowel habits- none.  Constipation- none.  Diarrhea- none.  Nausea- none.  Vomiting- none.  MUSCULOSKELETAL: Joint pain- none.  Muscle aches- none.  DERMATOLOGY: Rash- none.  NEUROLOGY: Dizziness- none.   Headache- none.  PSYCHIATRY: Denies any depression or anxiety     Vitals:    09/25/24 1119   BP: 110/60   Pulse: 73   Resp: 16   Temp: 36.7 °C (98 °F)   TempSrc: Core   SpO2: 98%   Weight: 85.7 kg (189 lb)   Height: 1.651 m (5' 5\")   PainSc: 0-No pain      BMI:Body mass index is 31.45 kg/m².   General Cardiology:  General Appearance: Alert, oriented and in no acute distress.  HEENT: extra ocular movements intact (EOMI), pupils equal,  round, reactive to light and accommodation (PERRLA).  Carotid Upstroke: no bruit, normal.  Jugular Venous Distention (JVD): flat.  Chest: normal.  Lungs: Clear to auscultation,   Heart Sounds: no S3 or S4, normal S1, S2, regular rate.  Murmur, Click, Gallop: 2+ mid systolic murmur.  Abdomen: no hepatomegaly, no masses felt, soft.  Extremities: no leg edema.  Peripheral pulses: 2 plus bilateral.  NEUROLOGY Cranial nerves II-XII grossly intact.     Patient Active Problem List   Diagnosis    Abnormality of right breast on screening mammogram    Allergic rhinitis    Chronic laryngitis    Chronic rhinitis    Anxiety    Arm paresthesia, right    Arthritis    Arthritis of both knees    Balance problem    Benign familial tremor    Bilateral impacted cerumen    Bilateral wheezing    Blood glucose elevated    Carpal tunnel syndrome of right wrist    Cervical radiculitis    Chronic hoarseness    Chronic pain    Chronic cough    Cough    Cutaneous candidiasis    DDD (degenerative disc disease), cervical    Depression    Duodenitis    Dupuytren's contracture    Elevated glucose    Hypertension    Fatigue    Fine motor impairment    Generalized abdominal pain    Gout    Hand weakness    Hearing loss of right " ear due to cerumen impaction    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    History of left knee replacement    Status post total right knee replacement    Hyperlipidemia    Hypothyroidism    IBS (irritable bowel syndrome)    Intercostal neuralgia    Left sided sciatica    Left-sided low back pain with left-sided sciatica    Lumbar spine pain    Knee pain, left    Lumbar radiculopathy    Mixed conductive and sensorineural hearing loss of both ears    Myalgia    Myofascial pain    Headache    Neck pain    Numbness of lip    Numbness of tongue    Osteoarthritis of fingers of hands, bilateral    Lesion of false vocal cord    Lesion of vocal cord    Pain in knee region after total knee replacement (CMS-HCC)    Chronic right shoulder pain    Palpitations    Pes anserine bursitis    Pharyngoesophageal phase dysphagia    Post-nasal drainage    Proximal humerus fracture    Renal insufficiency    Restless legs    Right hip pain    Internal impingement of right shoulder    RUE weakness    Sacroiliac joint dysfunction    Segmental and somatic dysfunction of cervical region    Thoracic myofascial strain    Bilateral tinnitus    Trochanteric bursitis of left hip    Vitamin D deficiency    Trigger middle finger of right hand    Trigger ring finger of right hand    MARTE (dyspnea on exertion)    Lung nodule    Mild obstructive sleep apnea    Osteoarthritis of right knee    Reactive airway disease (HHS-HCC)    Right shoulder pain    Suspected sleep apnea    Transient ischemic attack    Arthritis of knee, left    Gastritis    Tinnitus    Acute left lumbar radiculopathy    Low back pain    Knee pain    Dysphagia    Paroxysmal atrial fibrillation (Multi)    Chronic obstructive pulmonary disease, unspecified COPD type (Multi)    Heart murmur    Epistaxis       Problem List Items Addressed This Visit       Hypertension    Hyperlipidemia - Primary    Relevant Orders    CBC    Basic Metabolic Panel    Lipid Panel     Palpitations    MARTE (dyspnea on exertion)    Heart murmur      69-year-old female patient with a history of hypertension hyperlipidemia.  No chest pain or tightness episode of palpitation fluttering.  Patient echocardiogram done about 3 months ago essentially unremarkable.  1.  Hypertension: Continue current lisinopril 20 mg tablet p.o. twice a day with the diltiazem 180 mg capsule p.o. daily.  2.  Hyperlipidemia: Continue current statin therapy.  Patient currently on Pravachol 40 mg tablet p.o. daily.  3.  Palpitation: Continue current sotalol as well as diltiazem as above.  4.  Valvular regurgitation: Patient had a echocardiogram done recently found to having moderate tricuspid regurgitation with a mild mitral regurgitation seen.  So far asymptomatic at the moment.    Advised patient to avoid lunch meats, canned soups, pizzas, bread rolls, and sandwiches. Advised patient to limit salt intake 1,500 mg daily. Advised patient to exercise 30 mins/3 times a week including treadmill or aerobic type, Goal to achieve 65% target HR.    Diet and exercise reviewed with patient..advice to walk about 10,000 steps or about 2 hours during day time. Cut back on salt, sugar and flour.    Pt. care time is spent includes review of diagnostic tests, labs, radiographs, EKGs, old echoes, cardiac work-up and coordination of care. Assessment, impression and plans are reflected in the note above as well as the orders.    Julian Tejada MD

## 2024-09-26 ENCOUNTER — OFFICE VISIT (OUTPATIENT)
Dept: URGENT CARE | Age: 79
End: 2024-09-26
Payer: MEDICARE

## 2024-09-26 VITALS
SYSTOLIC BLOOD PRESSURE: 124 MMHG | DIASTOLIC BLOOD PRESSURE: 73 MMHG | BODY MASS INDEX: 31.45 KG/M2 | TEMPERATURE: 98.2 F | RESPIRATION RATE: 16 BRPM | OXYGEN SATURATION: 97 % | HEART RATE: 71 BPM | WEIGHT: 189 LBS

## 2024-09-26 DIAGNOSIS — L08.9: Primary | ICD-10-CM

## 2024-09-26 DIAGNOSIS — S60.351A: Primary | ICD-10-CM

## 2024-09-26 RX ORDER — SULFAMETHOXAZOLE AND TRIMETHOPRIM 800; 160 MG/1; MG/1
1 TABLET ORAL 2 TIMES DAILY
Qty: 6 TABLET | Refills: 0 | Status: SHIPPED | OUTPATIENT
Start: 2024-09-26 | End: 2024-09-29

## 2024-09-26 ASSESSMENT — ENCOUNTER SYMPTOMS: WOUND: 1

## 2024-09-26 NOTE — PROGRESS NOTES
Subjective   Patient ID: Natividad Tena is a 78 y.o. female. They present today with a chief complaint of Injury (Thorn in right thumb).    History of Present Illness  Patient reports getting stuck by a leo thorn 2 days ago and got some of thorn in right thumb, denies fever or chills.            Past Medical History  Allergies as of 09/26/2024 - Reviewed 09/26/2024   Allergen Reaction Noted    Bee pollen Unknown 09/05/2023    Cefdinir Diarrhea, Other, and Unknown 03/04/2023    Ciprofloxacin Other and Unknown 03/04/2023    Iodinated contrast media Other 09/05/2023    Iodine Unknown and Other 03/04/2023       (Not in a hospital admission)       Past Medical History:   Diagnosis Date    A-fib (Multi) 03/04/2023    Acute maxillary sinusitis, unspecified 01/25/2016    Acute maxillary sinusitis    Allergic rhinitis, unspecified 12/07/2013    Allergic rhinitis    Anemia, unspecified 12/07/2013    Anemia    Cough, unspecified 12/15/2015    Cough    Cough, unspecified 07/23/2014    Cough    Cough, unspecified 03/23/2015    Cough    MARTE (dyspnea on exertion) 09/05/2023    Dysphonia     Dysphonia    Gastro-esophageal reflux disease without esophagitis 12/07/2013    Esophageal reflux    Generalized abdominal pain 07/31/2014    Generalized abdominal pain    Hyperlipidemia 03/04/2023    Hypertension 03/04/2023    Other cervical disc displacement, unspecified cervical region     Herniated disc, cervical    Palpitations 03/04/2023    Paroxysmal atrial fibrillation (Multi) 09/05/2023    Personal history of other diseases of the respiratory system 12/03/2014    History of allergic rhinitis    Personal history of other diseases of the respiratory system 04/10/2014    History of allergic rhinitis    Personal history of transient ischemic attack (TIA), and cerebral infarction without residual deficits     History of transient cerebral ischemia    Unspecified fracture of shaft of humerus, right arm, initial encounter for closed fracture  2017    Fracture of humerus, right, closed       Past Surgical History:   Procedure Laterality Date    BLADDER SURGERY  2014    Bladder Surgery     SECTION, CLASSIC  2014     Section    EYE SURGERY  2014    Eye Surgery    HYSTERECTOMY  2014    Hysterectomy        reports that she has never smoked. She has been exposed to tobacco smoke. She has never used smokeless tobacco. She reports that she does not currently use alcohol. She reports that she does not currently use drugs.    Review of Systems  Review of Systems   Skin:  Positive for wound.   All other systems reviewed and are negative.                                 Objective    Vitals:    24 1447   BP: 124/73   BP Location: Left arm   Pulse: 71   Resp: 16   Temp: 36.8 °C (98.2 °F)   SpO2: 97%   Weight: 85.7 kg (189 lb)     No LMP recorded (lmp unknown). Patient is postmenopausal.    Physical Exam  Vitals reviewed.   Constitutional:       Appearance: Normal appearance.   HENT:      Head: Normocephalic.   Cardiovascular:      Rate and Rhythm: Normal rate and regular rhythm.      Pulses: Normal pulses.      Heart sounds: Normal heart sounds.   Pulmonary:      Effort: Pulmonary effort is normal.      Breath sounds: Normal breath sounds.   Musculoskeletal:         General: Normal range of motion.      Cervical back: Normal range of motion.   Skin:     General: Skin is warm and dry.      Capillary Refill: Capillary refill takes less than 2 seconds.      Findings: Erythema present.   Neurological:      General: No focal deficit present.      Mental Status: She is alert and oriented to person, place, and time.   Psychiatric:         Mood and Affect: Mood normal.         Behavior: Behavior normal.         Procedures    Point of Care Test & Imaging Results from this visit  No results found for this visit on 24.   No results found.    Diagnostic study results (if any) were reviewed by Carlita Christopher  APRN-CNP.    Assessment/Plan   Allergies, medications, history, and pertinent labs/EKGs/Imaging reviewed by TAMMY Perez.     Medical Decision Making  Patient in NAD, VSS, HRR, lungs clear  Reports she got stuck by a thorn 2 days ago and thorn still in right thumb pad.  On exam there is erythema to thumb pad localized, small black thorn removed with forceps without difficulty, patient tolerated, bacitracin applied and bandaid.   Start empiric antibiotics Bactrim as directed, go to ED with worsening symptoms, patient agrees with plan of care.       Orders and Diagnoses  There are no diagnoses linked to this encounter.    Medical Admin Record      Patient disposition: Home    Electronically signed by TAMMY Perez  3:05 PM

## 2024-09-27 ENCOUNTER — LAB (OUTPATIENT)
Dept: LAB | Facility: LAB | Age: 79
End: 2024-09-27
Payer: MEDICARE

## 2024-09-27 DIAGNOSIS — E78.2 MIXED HYPERLIPIDEMIA: ICD-10-CM

## 2024-09-27 LAB
ANION GAP SERPL CALC-SCNC: 15 MMOL/L (ref 10–20)
BUN SERPL-MCNC: 26 MG/DL (ref 6–23)
CALCIUM SERPL-MCNC: 9 MG/DL (ref 8.6–10.3)
CHLORIDE SERPL-SCNC: 104 MMOL/L (ref 98–107)
CHOLEST SERPL-MCNC: 185 MG/DL (ref 0–199)
CHOLESTEROL/HDL RATIO: 2.3
CO2 SERPL-SCNC: 24 MMOL/L (ref 21–32)
CREAT SERPL-MCNC: 1.01 MG/DL (ref 0.5–1.05)
EGFRCR SERPLBLD CKD-EPI 2021: 57 ML/MIN/1.73M*2
ERYTHROCYTE [DISTWIDTH] IN BLOOD BY AUTOMATED COUNT: 14.1 % (ref 11.5–14.5)
GLUCOSE SERPL-MCNC: 92 MG/DL (ref 74–99)
HCT VFR BLD AUTO: 37.2 % (ref 36–46)
HDLC SERPL-MCNC: 80.2 MG/DL
HGB BLD-MCNC: 11.6 G/DL (ref 12–16)
LDLC SERPL CALC-MCNC: 92 MG/DL
MCH RBC QN AUTO: 30.1 PG (ref 26–34)
MCHC RBC AUTO-ENTMCNC: 31.2 G/DL (ref 32–36)
MCV RBC AUTO: 97 FL (ref 80–100)
NON HDL CHOLESTEROL: 105 MG/DL (ref 0–149)
NRBC BLD-RTO: 0 /100 WBCS (ref 0–0)
PLATELET # BLD AUTO: 216 X10*3/UL (ref 150–450)
POTASSIUM SERPL-SCNC: 4.7 MMOL/L (ref 3.5–5.3)
RBC # BLD AUTO: 3.85 X10*6/UL (ref 4–5.2)
SODIUM SERPL-SCNC: 138 MMOL/L (ref 136–145)
TRIGL SERPL-MCNC: 65 MG/DL (ref 0–149)
VLDL: 13 MG/DL (ref 0–40)
WBC # BLD AUTO: 5.8 X10*3/UL (ref 4.4–11.3)

## 2024-09-27 PROCEDURE — 80048 BASIC METABOLIC PNL TOTAL CA: CPT

## 2024-09-27 PROCEDURE — 36415 COLL VENOUS BLD VENIPUNCTURE: CPT

## 2024-09-27 PROCEDURE — 85027 COMPLETE CBC AUTOMATED: CPT

## 2024-09-27 PROCEDURE — 80061 LIPID PANEL: CPT

## 2024-10-08 NOTE — PROGRESS NOTES
"[facility]  Followup Visit Note        HISTORY OF PRESENT ILLNESS   Natividad Tena is a 78 y.o. female who presents to a [facility] for followup.       Current History    On today's visit, the patient reports she is doing well with CPAP overall.  Still has some minor issues with humidity and mask.  She does find the N30 mask is more comfortable.  She did not the pressure seem to go too high and woke her up last night but generally pressure was not bothersome to her.    She states since she has been on CPAP her atrial fibrillation and hypertension have been better controlled.    Previous visit 12/15/2023  OBSTRUCTIVE SLEEP APNEA / EDS  -Benefiting from CPAP  -turn ramp off, change to n30 mask  -increase humidity for dry mouth  -new supplies ordered  -Tolerating mask, pressure, humidity well     HYPERTENSION  -/72 today  -1 month refill of diltazem     OBESITY  -BMI 31 TODAY    Follow up 3-6 months    PAP Adherence:      Sleep Scales:  ESS: 14     REVIEW OF SYSTEMS    All other systems negative      PHYSICAL EXAM     VITAL SIGNS: /78   Pulse 68   Ht 1.651 m (5' 5\")   Wt 86.2 kg (190 lb)   LMP  (LMP Unknown)   SpO2 96%   BMI 31.62 kg/m²      PREVIOUS WEIGHTS:  Wt Readings from Last 3 Encounters:   10/09/24 86.2 kg (190 lb)   09/26/24 85.7 kg (189 lb)   09/25/24 85.7 kg (189 lb)       Constitutional: Alert and oriented, cooperative, no obvious distress   HEENT: Non icteric or anemic, EOM WNL bilaterally   Neck: Supple, no JVD, no goiter, no adenopathy, no rigidity  Extremities: No clubbing, no LL edema   Neuromuscular: Cranial nerves grossly intact, no focal deficits     RESULTS/DATA     Iron (ug/dL)   Date Value   12/26/2019 116     Iron Saturation (%)   Date Value   12/26/2019 35     TIBC (ug/dL)   Date Value   12/26/2019 329     Ferritin (ug/L)   Date Value   12/26/2019 84         ASSESSMENT/PLAN     Ms. Tena is a 78 y.o. female " and returns in followup for the following issues:    OBSTRUCTIVE SLEEP APNEA / EXCESSIVE SLEEPINESS / DRY MOUTH  -Benefiting from CPAP -better control blood pressure and low risk of atrial fibrillation  -Good compliance, ALLEGRA well controlled per download  -Discussed possible alternative mask options she can consider -she would like to stick with N30 at this time  -I increased humidity setting to 6 -discussed rainout and solutions to this problem  -still feels tired during day - discussed she may want to consider stopping tylenol PM but she feels she needs it at this time due to stress - ALLEGRA not likely cause of sleepiness    BMI>30  -Body mass index is 31.62 kg/m².  today  -With sufficient weight loss may no longer require treatment for ALLEGRA    HYPERTENSION / AFIB  BP Readings from Last 3 Encounters:   10/09/24 125/78   09/26/24 124/73   09/25/24 110/60      -Followed by PCP/cardiology    Follow up 1 year

## 2024-10-09 ENCOUNTER — OFFICE VISIT (OUTPATIENT)
Dept: SLEEP MEDICINE | Facility: CLINIC | Age: 79
End: 2024-10-09
Payer: MEDICARE

## 2024-10-09 VITALS
HEIGHT: 65 IN | DIASTOLIC BLOOD PRESSURE: 78 MMHG | SYSTOLIC BLOOD PRESSURE: 125 MMHG | HEART RATE: 68 BPM | WEIGHT: 190 LBS | OXYGEN SATURATION: 96 % | BODY MASS INDEX: 31.65 KG/M2

## 2024-10-09 DIAGNOSIS — K11.7 XEROSTOMIA: ICD-10-CM

## 2024-10-09 DIAGNOSIS — I10 ESSENTIAL HYPERTENSION: ICD-10-CM

## 2024-10-09 DIAGNOSIS — G47.19 EXCESSIVE DAYTIME SLEEPINESS: ICD-10-CM

## 2024-10-09 DIAGNOSIS — G47.33 OSA (OBSTRUCTIVE SLEEP APNEA): Primary | ICD-10-CM

## 2024-10-09 PROCEDURE — 99214 OFFICE O/P EST MOD 30 MIN: CPT | Performed by: PHYSICIAN ASSISTANT

## 2024-10-09 PROCEDURE — 3078F DIAST BP <80 MM HG: CPT | Performed by: PHYSICIAN ASSISTANT

## 2024-10-09 PROCEDURE — 1126F AMNT PAIN NOTED NONE PRSNT: CPT | Performed by: PHYSICIAN ASSISTANT

## 2024-10-09 PROCEDURE — 1036F TOBACCO NON-USER: CPT | Performed by: PHYSICIAN ASSISTANT

## 2024-10-09 PROCEDURE — G2211 COMPLEX E/M VISIT ADD ON: HCPCS | Performed by: PHYSICIAN ASSISTANT

## 2024-10-09 PROCEDURE — 3074F SYST BP LT 130 MM HG: CPT | Performed by: PHYSICIAN ASSISTANT

## 2024-10-09 PROCEDURE — 1123F ACP DISCUSS/DSCN MKR DOCD: CPT | Performed by: PHYSICIAN ASSISTANT

## 2024-10-09 PROCEDURE — 1159F MED LIST DOCD IN RCRD: CPT | Performed by: PHYSICIAN ASSISTANT

## 2024-10-09 PROCEDURE — 1157F ADVNC CARE PLAN IN RCRD: CPT | Performed by: PHYSICIAN ASSISTANT

## 2024-10-09 PROCEDURE — 1160F RVW MEDS BY RX/DR IN RCRD: CPT | Performed by: PHYSICIAN ASSISTANT

## 2024-10-09 ASSESSMENT — SLEEP AND FATIGUE QUESTIONNAIRES
HOW LIKELY ARE YOU TO NOD OFF OR FALL ASLEEP WHILE SITTING AND READING: SLIGHT CHANCE OF DOZING
HOW LIKELY ARE YOU TO NOD OFF OR FALL ASLEEP WHEN YOU ARE A PASSENGER IN A CAR FOR AN HOUR WITHOUT A BREAK: HIGH CHANCE OF DOZING
SITING INACTIVE IN A PUBLIC PLACE LIKE A CLASS ROOM OR A MOVIE THEATER: MODERATE CHANCE OF DOZING
HOW LIKELY ARE YOU TO NOD OFF OR FALL ASLEEP WHILE SITTING AND TALKING TO SOMEONE: WOULD NEVER DOZE
HOW LIKELY ARE YOU TO NOD OFF OR FALL ASLEEP IN A CAR, WHILE STOPPED FOR A FEW MINUTES IN TRAFFIC: WOULD NEVER DOZE
HOW LIKELY ARE YOU TO NOD OFF OR FALL ASLEEP WHILE SITTING QUIETLY AFTER LUNCH WITHOUT ALCOHOL: HIGH CHANCE OF DOZING
HOW LIKELY ARE YOU TO NOD OFF OR FALL ASLEEP WHILE WATCHING TV: MODERATE CHANCE OF DOZING
ESS-CHAD TOTAL SCORE: 14
HOW LIKELY ARE YOU TO NOD OFF OR FALL ASLEEP WHILE LYING DOWN TO REST IN THE AFTERNOON WHEN CIRCUMSTANCES PERMIT: HIGH CHANCE OF DOZING

## 2024-10-09 ASSESSMENT — PATIENT HEALTH QUESTIONNAIRE - PHQ9
SUM OF ALL RESPONSES TO PHQ9 QUESTIONS 1 & 2: 0
2. FEELING DOWN, DEPRESSED OR HOPELESS: NOT AT ALL
1. LITTLE INTEREST OR PLEASURE IN DOING THINGS: NOT AT ALL

## 2024-10-09 ASSESSMENT — LIFESTYLE VARIABLES: HOW MANY STANDARD DRINKS CONTAINING ALCOHOL DO YOU HAVE ON A TYPICAL DAY: PATIENT DOES NOT DRINK

## 2024-10-09 ASSESSMENT — PAIN SCALES - GENERAL: PAINLEVEL: 0-NO PAIN

## 2024-10-09 NOTE — PATIENT INSTRUCTIONS
Great seeing you today,    CPAP data looks very good. We will order supplies for your CPAP.    Let us know if you have any issues with pressure, humidity, or mask.    We will plan on seeing you back in 1 year    Shawn Rouse PA-C    IMPORTANT PHONE NUMBERS     [phone] ([phone])  [facility] [employer] (DME): [phone]  For clinical questions and refilling prescriptions: [phone]  Katy Brown (For Vidhi/Padma): P: [phone]

## 2024-10-18 ENCOUNTER — LAB (OUTPATIENT)
Dept: LAB | Facility: LAB | Age: 79
End: 2024-10-18
Payer: MEDICARE

## 2024-10-18 ENCOUNTER — APPOINTMENT (OUTPATIENT)
Dept: OTOLARYNGOLOGY | Facility: CLINIC | Age: 79
End: 2024-10-18
Payer: MEDICARE

## 2024-10-18 DIAGNOSIS — R04.0 EPISTAXIS: ICD-10-CM

## 2024-10-18 DIAGNOSIS — H93.19 TINNITUS, UNSPECIFIED LATERALITY: ICD-10-CM

## 2024-10-18 DIAGNOSIS — J30.9 ALLERGIC RHINITIS, UNSPECIFIED SEASONALITY, UNSPECIFIED TRIGGER: ICD-10-CM

## 2024-10-18 DIAGNOSIS — J30.9 ALLERGIC RHINITIS, UNSPECIFIED SEASONALITY, UNSPECIFIED TRIGGER: Primary | ICD-10-CM

## 2024-10-18 DIAGNOSIS — R09.82 POST-NASAL DRAINAGE: ICD-10-CM

## 2024-10-18 DIAGNOSIS — H91.93 DECREASED HEARING OF BOTH EARS: ICD-10-CM

## 2024-10-18 DIAGNOSIS — J31.0 CHRONIC RHINITIS: ICD-10-CM

## 2024-10-18 PROCEDURE — 1036F TOBACCO NON-USER: CPT | Performed by: OTOLARYNGOLOGY

## 2024-10-18 PROCEDURE — 82785 ASSAY OF IGE: CPT

## 2024-10-18 PROCEDURE — 1123F ACP DISCUSS/DSCN MKR DOCD: CPT | Performed by: OTOLARYNGOLOGY

## 2024-10-18 PROCEDURE — 1159F MED LIST DOCD IN RCRD: CPT | Performed by: OTOLARYNGOLOGY

## 2024-10-18 PROCEDURE — 99214 OFFICE O/P EST MOD 30 MIN: CPT | Performed by: OTOLARYNGOLOGY

## 2024-10-18 PROCEDURE — 86003 ALLG SPEC IGE CRUDE XTRC EA: CPT

## 2024-10-18 PROCEDURE — 1157F ADVNC CARE PLAN IN RCRD: CPT | Performed by: OTOLARYNGOLOGY

## 2024-10-18 RX ORDER — IPRATROPIUM BROMIDE 42 UG/1
2 SPRAY, METERED NASAL 2 TIMES DAILY
Qty: 15 ML | Refills: 1 | Status: SHIPPED | OUTPATIENT
Start: 2024-10-18 | End: 2024-11-08

## 2024-10-18 ASSESSMENT — ENCOUNTER SYMPTOMS
DEPRESSION: 0
LOSS OF SENSATION IN FEET: 0
OCCASIONAL FEELINGS OF UNSTEADINESS: 1

## 2024-10-18 NOTE — PROGRESS NOTES
"History Of Present Illness  Natividad Tena is a 78 y.o. female presenting with: \"Nosebleeds, sinus drainage, phlegm/coughing/throat clearing/tinnitus, stuffy ears,\".    She has chronic postnasal discharge  She has frequent clear runny nose, it feels itchy.  Left side of nose particularly feels stuffy for years.  History of nosebleeds. Last one lasted for 4.5 hours.  Last night it bled little bit.    She has tinnitus as far as she can remember.  She feels pressure in her ears, off and on.    Lots of phlegm in her throat, lots of throat clearing.  Sometimes, when she eats, feels like food gets stuck in her throat, off and on.     She thinks she has environmental allergies.    On examination, there was wax build up in left ear canal  It was cleaned. TMs look intact. There are areas of whitish scar over anterior nasal septum mucosa on both sides.  No visible nasal or postnasal discharge.  No palpable neck mass.  She feels reflux rarely.  She uses lisinopril and sotalol.     My impression, patient has allergic and possibly senile rhinitis; chronic pnd, dysphagia. 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    Plan:  1- allergy test  2- hearing test  3- coconut oil for nose  4- ipratropium spray for nasal and postnasal discharge  5- follow up in one month     Past Medical History  She has a past medical history of A-fib (Multi) (03/04/2023), Acute maxillary sinusitis, unspecified (01/25/2016), Allergic rhinitis, unspecified (12/07/2013), Anemia, unspecified (12/07/2013), Cough, unspecified (12/15/2015), Cough, unspecified (07/23/2014), Cough, unspecified (03/23/2015), MARTE (dyspnea on exertion) (09/05/2023), Dysphonia, Gastro-esophageal reflux disease without esophagitis (12/07/2013), Generalized abdominal pain (07/31/2014), Hyperlipidemia (03/04/2023), Hypertension (03/04/2023), Other cervical disc displacement, unspecified cervical region, Palpitations (03/04/2023), Paroxysmal atrial fibrillation (Multi) (09/05/2023), " Personal history of other diseases of the respiratory system (2014), Personal history of other diseases of the respiratory system (04/10/2014), Personal history of transient ischemic attack (TIA), and 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    Surgical History  She has a past surgical history that includes Bladder surgery (2014);  section, classic (2014); Hysterectomy (2014); and Eye surgery (2014).     Social History  She reports that she has never smoked. She has been exposed to tobacco smoke. She has never used smokeless tobacco. She reports that she does not currently use alcohol. She reports that she does not currently use drugs.    Family History  Family History   Problem Relation Name Age of Onset    Hypertension Mother      Cancer Mother      Cancer Father      Heart attack Father      Hypertension Brother          Allergies  Bee pollen, Cefdinir, Ciprofloxacin, Iodinated contrast media, and Iodine    Review of Systems   Feeling tired  Eyes itch  Tinnitus  Ears feel full  Nosebleeds  Nasal discharge  Sinus pressure  Nasal blockage  Postnasal drip  Murmur  Leg pain  Cough  Coughing up sputum  Joint pain  Muscle pain  Joint stiffness  Anxiety  Hot flashes  Easy bruising     Physical Exam    General appearance: Healthy-appearing, well-nourished, well groomed, in no acute distress.     Head and Face: Atraumatic with no masses, lesions, or scarring.      Salivary glands: No tenderness of the parotid glands or parotid masses.     No tenderness of the submandibular glands or submandibular masses.      Facial strength: Normal strength and symmetry, no synkinesis or facial tic.     Eyes: Conjunctivas look non-hyperemic bilaterally    Ears: Bilaterally ear canals look normal. Tympanic membranes look intact, no hyperemia, fluid or retraction. Hearing grossly normal.   "    Nose: Scars over anterior nasal septum mucosa on both sides.    Oral Cavity/Mouth: Lips and tongue look normal.     Throat: No postnasal discharge. No tonsil hypertrophy. No hyperemia.    Neck: Symmetrical, trachea midline.     Pulmonary: Normal respiratory effort.     Lymphatic: No palpable pathologic lymph nodes at neck.     Neurological/Psychiatric Orientation to person, place, and time: Normal.     Mood and affect: Normal.      Extremities: No clubbing.     Skin: No significant skin lesions were noted at face or neck        Procedure         Last Recorded Vitals  There were no vitals taken for this visit.    Relevant Results    Assessment and Plan:  Natividad Tena is a 78 y.o. female presenting with: \"Nosebleeds, sinus drainage, phlegm/coughing/throat clearing/tinnitus, stuffy ears,\".    She has chronic postnasal discharge  She has frequent clear runny nose, it feels itchy.  Left side of nose particularly feels stuffy for years.  History of nosebleeds. Last one lasted for 4.5 hours.  Last night it bled little bit.    She has tinnitus as far as she can remember.  She feels pressure in her ears, off and on.    Lots of phlegm in her throat, lots of throat clearing.  Sometimes, when she eats, feels like food gets stuck in her throat, off and on.     She thinks she has environmental allergies.    On examination, there was wax build up in left ear canal  It was cleaned. TMs look intact. There are areas of whitish scar over anterior nasal septum mucosa on both sides.  No visible nasal or postnasal discharge.  No palpable neck mass.  She feels reflux rarely.  She uses lisinopril and sotalol.     My impression, patient has allergic and possibly senile rhinitis; chronic pnd, dysphagia. 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    Plan:  1- allergy test  2- hearing test  3- coconut oil for nose  4- ipratropium spray for nasal and postnasal discharge  5- follow up in one month    [facility]  Otolaryngology - " Head & Neck Surgery

## 2024-10-24 LAB

## 2024-11-13 ENCOUNTER — APPOINTMENT (OUTPATIENT)
Dept: AUDIOLOGY | Facility: CLINIC | Age: 79
End: 2024-11-13
Payer: MEDICARE

## 2024-11-13 ENCOUNTER — APPOINTMENT (OUTPATIENT)
Dept: OTOLARYNGOLOGY | Facility: CLINIC | Age: 79
End: 2024-11-13
Payer: MEDICARE

## 2024-11-13 DIAGNOSIS — H91.13 PRESBYCUSIS OF BOTH EARS: Primary | ICD-10-CM

## 2024-11-13 DIAGNOSIS — H90.3 SENSORINEURAL HEARING LOSS (SNHL) OF BOTH EARS: Primary | ICD-10-CM

## 2024-11-13 DIAGNOSIS — H90.3 ASYMMETRICAL SENSORINEURAL HEARING LOSS: ICD-10-CM

## 2024-11-13 DIAGNOSIS — H93.13 TINNITUS OF BOTH EARS: ICD-10-CM

## 2024-11-13 PROCEDURE — 92550 TYMPANOMETRY & REFLEX THRESH: CPT | Performed by: AUDIOLOGIST

## 2024-11-13 PROCEDURE — 1159F MED LIST DOCD IN RCRD: CPT | Performed by: OTOLARYNGOLOGY

## 2024-11-13 PROCEDURE — 99213 OFFICE O/P EST LOW 20 MIN: CPT | Performed by: OTOLARYNGOLOGY

## 2024-11-13 PROCEDURE — 1157F ADVNC CARE PLAN IN RCRD: CPT | Performed by: OTOLARYNGOLOGY

## 2024-11-13 PROCEDURE — 1123F ACP DISCUSS/DSCN MKR DOCD: CPT | Performed by: OTOLARYNGOLOGY

## 2024-11-13 PROCEDURE — 1036F TOBACCO NON-USER: CPT | Performed by: OTOLARYNGOLOGY

## 2024-11-13 PROCEDURE — 92557 COMPREHENSIVE HEARING TEST: CPT | Performed by: AUDIOLOGIST

## 2024-11-13 NOTE — PROGRESS NOTES
"History Of Present Illness    11.13.2024: Laboratory tests has shown minimal allergy to KB grass. 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 Bilateral type A tympanograms.  She is using ipratropium spray and coconut oil for her nose.    Recommendation  1-follow-up in 1 year  _____________________________________________________________________    Natividad Tena is a 78 y.o. female presenting with: \"Nosebleeds, sinus drainage, phlegm/coughing/throat clearing/tinnitus, stuffy ears,\".    She has chronic postnasal discharge  She has frequent clear runny nose, it feels itchy.  Left side of nose particularly feels stuffy for years.  History of nosebleeds. Last one lasted for 4.5 hours.  Last night it bled little bit.    She has tinnitus as far as she can remember.  She feels pressure in her ears, off and on.    Lots of phlegm in her throat, lots of throat clearing.  Sometimes, when she eats, feels like food gets stuck in her throat, off and on.     She thinks she has environmental allergies.    On examination, there was wax build up in left ear canal  It was cleaned. TMs look intact. There are areas of whitish scar over anterior nasal septum mucosa on both sides.  No visible nasal or postnasal discharge.  No palpable neck mass.  She feels reflux rarely.  She uses lisinopril and sotalol.     My impression, patient has allergic and possibly senile rhinitis; chronic pnd, dysphagia. 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    Plan:  1- allergy test  2- hearing test  3- coconut oil for nose  4- ipratropium spray for nasal and postnasal discharge  5- follow up in one month     Past Medical History  She has a past medical history of A-fib (Multi) (03/04/2023), Acute maxillary sinusitis, unspecified (01/25/2016), Allergic rhinitis, unspecified (12/07/2013), Anemia, unspecified (12/07/2013), Cough, unspecified (12/15/2015), Cough, unspecified (07/23/2014), Cough, unspecified (03/23/2015), MARTE (dyspnea on exertion) " (2023), Dysphonia, Gastro-esophageal reflux disease without esophagitis (2013), Generalized abdominal pain (2014), Hyperlipidemia (2023), Hypertension (2023), Other cervical disc displacement, unspecified cervical region, Palpitations (2023), Paroxysmal atrial fibrillation (Multi) (2023), Personal history of other diseases of the respiratory system (2014), Personal history of other diseases of the respiratory system (04/10/2014), Personal history of transient ischemic attack (TIA), and 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    Surgical History  She has a past surgical history that includes Bladder surgery (2014);  section, classic (2014); Hysterectomy (2014); and Eye surgery (2014).     Social History  She reports that she has never smoked. She has been exposed to tobacco smoke. She has never used smokeless tobacco. She reports that she does not currently use alcohol. She reports that she does not currently use drugs.    Family History  Family History   Problem Relation Name Age of Onset    Hypertension Mother      Cancer Mother      Cancer Father      Heart attack Father      Hypertension Brother          Allergies  Bee pollen, Cefdinir, Ciprofloxacin, Iodinated contrast media, and Iodine    Review of Systems (initial ROS)  Feeling tired  Eyes itch  Tinnitus  Ears feel full  Nosebleeds  Nasal discharge  Sinus pressure  Nasal blockage  Postnasal drip  Murmur  Leg pain  Cough  Coughing up sputum  Joint pain  Muscle pain  Joint stiffness  Anxiety  Hot flashes  Easy bruising     Physical Exam (initial exam)    General appearance: Healthy-appearing, well-nourished, well groomed, in no acute distress.     Head and Face: Atraumatic with no masses, lesions, or scarring.      Salivary glands: No tenderness of the parotid glands or  "parotid masses.     No tenderness of the submandibular glands or submandibular masses.      Facial strength: Normal strength and symmetry, no synkinesis or facial tic.     Eyes: Conjunctivas look non-hyperemic bilaterally    Ears: Bilaterally ear canals look normal. Tympanic membranes look intact, no hyperemia, fluid or retraction. Hearing grossly normal.      Nose: Scars over anterior nasal septum mucosa on both sides.    Oral Cavity/Mouth: Lips and tongue look normal.     Throat: No postnasal discharge. No tonsil hypertrophy. No hyperemia.    Neck: Symmetrical, trachea midline.     Pulmonary: Normal respiratory effort.     Lymphatic: No palpable pathologic lymph nodes at neck.     Neurological/Psychiatric Orientation to person, place, and time: Normal.     Mood and affect: Normal.      Extremities: No clubbing.     Skin: No significant skin lesions were noted at face or neck        Procedure         Last Recorded Vitals  There were no vitals taken for this visit.    Relevant Results    Assessment and Plan:    11.13.2024: Laboratory tests has shown minimal allergy to KB grass. 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 Bilateral type A tympanograms.  She is using ipratropium spray and coconut oil for her nose.    Recommendation  1-follow-up in 1 year  _____________________________________________________________________    Natividad Tena is a 78 y.o. female presenting with: \"Nosebleeds, sinus drainage, phlegm/coughing/throat clearing/tinnitus, stuffy ears,\".    She has chronic postnasal discharge  She has frequent clear runny nose, it feels itchy.  Left side of nose particularly feels stuffy for years.  History of nosebleeds. Last one lasted for 4.5 hours.  Last night it bled little bit.    She has tinnitus as far as she can remember.  She feels pressure in her ears, off and on.    Lots of phlegm in her throat, lots of throat clearing.  Sometimes, when she eats, feels like food gets stuck in her throat, off and on. "     She thinks she has environmental allergies.    On examination, there was wax build up in left ear canal  It was cleaned. TMs look intact. There are areas of whitish scar over anterior nasal septum mucosa on both sides.  No visible nasal or postnasal discharge.  No palpable neck mass.  She feels reflux rarely.  She uses lisinopril and sotalol.     My impression, patient has allergic and possibly senile rhinitis; chronic pnd, dysphagia. 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    Plan:  1- allergy test  2- hearing test  3- coconut oil for nose  4- ipratropium spray for nasal and postnasal discharge  5- follow up in one month    [facility]  Otolaryngology - Head & Neck Surgery

## 2024-11-27 ENCOUNTER — APPOINTMENT (OUTPATIENT)
Dept: PRIMARY CARE | Facility: CLINIC | Age: 79
End: 2024-11-27
Payer: MEDICARE

## 2024-11-27 ENCOUNTER — APPOINTMENT (OUTPATIENT)
Dept: OTOLARYNGOLOGY | Facility: CLINIC | Age: 79
End: 2024-11-27
Payer: MEDICARE

## 2024-11-27 VITALS
BODY MASS INDEX: 31.39 KG/M2 | WEIGHT: 188.4 LBS | DIASTOLIC BLOOD PRESSURE: 66 MMHG | SYSTOLIC BLOOD PRESSURE: 102 MMHG | HEART RATE: 66 BPM | HEIGHT: 65 IN | OXYGEN SATURATION: 96 %

## 2024-11-27 DIAGNOSIS — R05.1 ACUTE COUGH: ICD-10-CM

## 2024-11-27 DIAGNOSIS — J40 BRONCHITIS: Primary | ICD-10-CM

## 2024-11-27 PROCEDURE — 1159F MED LIST DOCD IN RCRD: CPT | Performed by: STUDENT IN AN ORGANIZED HEALTH CARE EDUCATION/TRAINING PROGRAM

## 2024-11-27 PROCEDURE — 1160F RVW MEDS BY RX/DR IN RCRD: CPT | Performed by: STUDENT IN AN ORGANIZED HEALTH CARE EDUCATION/TRAINING PROGRAM

## 2024-11-27 PROCEDURE — 3074F SYST BP LT 130 MM HG: CPT | Performed by: STUDENT IN AN ORGANIZED HEALTH CARE EDUCATION/TRAINING PROGRAM

## 2024-11-27 PROCEDURE — 1157F ADVNC CARE PLAN IN RCRD: CPT | Performed by: STUDENT IN AN ORGANIZED HEALTH CARE EDUCATION/TRAINING PROGRAM

## 2024-11-27 PROCEDURE — 3078F DIAST BP <80 MM HG: CPT | Performed by: STUDENT IN AN ORGANIZED HEALTH CARE EDUCATION/TRAINING PROGRAM

## 2024-11-27 PROCEDURE — 99213 OFFICE O/P EST LOW 20 MIN: CPT | Performed by: STUDENT IN AN ORGANIZED HEALTH CARE EDUCATION/TRAINING PROGRAM

## 2024-11-27 PROCEDURE — 1123F ACP DISCUSS/DSCN MKR DOCD: CPT | Performed by: STUDENT IN AN ORGANIZED HEALTH CARE EDUCATION/TRAINING PROGRAM

## 2024-11-27 RX ORDER — ALBUTEROL SULFATE 90 UG/1
2 INHALANT RESPIRATORY (INHALATION) EVERY 4 HOURS PRN
Qty: 8.5 G | Refills: 5 | Status: SHIPPED | OUTPATIENT
Start: 2024-11-27 | End: 2025-11-27

## 2024-11-27 RX ORDER — FLUTICASONE PROPIONATE 232 UG/1
2 POWDER, METERED RESPIRATORY (INHALATION) 2 TIMES DAILY
Qty: 1 EACH | Refills: 1 | Status: SHIPPED | OUTPATIENT
Start: 2024-11-27

## 2024-11-27 RX ORDER — METHYLPREDNISOLONE 4 MG/1
TABLET ORAL
Qty: 21 TABLET | Refills: 0 | Status: SHIPPED | OUTPATIENT
Start: 2024-11-27 | End: 2024-12-03

## 2024-11-27 RX ORDER — DOXYCYCLINE 100 MG/1
100 CAPSULE ORAL 2 TIMES DAILY
Qty: 14 CAPSULE | Refills: 0 | Status: SHIPPED | OUTPATIENT
Start: 2024-11-27 | End: 2024-12-04

## 2024-11-27 RX ORDER — BENZONATATE 200 MG/1
200 CAPSULE ORAL 3 TIMES DAILY PRN
Qty: 42 CAPSULE | Refills: 0 | Status: SHIPPED | OUTPATIENT
Start: 2024-11-27 | End: 2024-12-27

## 2024-11-27 ASSESSMENT — ENCOUNTER SYMPTOMS
SORE THROAT: 0
HEADACHES: 0
DYSURIA: 0
DIARRHEA: 0
COUGH: 1

## 2024-11-27 ASSESSMENT — PATIENT HEALTH QUESTIONNAIRE - PHQ9
1. LITTLE INTEREST OR PLEASURE IN DOING THINGS: NOT AT ALL
SUM OF ALL RESPONSES TO PHQ9 QUESTIONS 1 AND 2: 0
2. FEELING DOWN, DEPRESSED OR HOPELESS: NOT AT ALL

## 2024-11-27 NOTE — PROGRESS NOTES
Subjective   Patient ID: Natividad Tena is a 79 y.o. female who presents for URI (PT IS HERE BECAUSE SHE IS NOT FEELING WELL. PT HAD FEVER, COUGH, CONGESTION, WHEEZING, ACHY, SINUS CONGESTION, START WEEK AGO LAST SUNDAY. PT DIDN'T TEST FOR COVID.).  URI   This is a new problem. The current episode started 1 to 4 weeks ago. The problem has been waxing and waning. The maximum temperature recorded prior to her arrival was 101 - 101.9 F. Associated symptoms include congestion and coughing. Pertinent negatives include no diarrhea, dysuria, headaches, joint pain, sneezing or sore throat. Associated symptoms comments: Pharyngitis, poor sleeping . She has tried inhaler use, sleep, increased fluids, decongestant and antihistamine for the symptoms.         Objective   Physical Exam      Current Outpatient Medications:     acetaminophen (Tylenol) 500 mg tablet, take 2 tablets every 8 hours for 5 days, after 5 days take 2 tablets every 8 hours only as needed for pain, Disp: , Rfl:     aspirin 81 mg EC tablet, Take 1 tablet (81 mg) by mouth every other day., Disp: , Rfl:     calcium carbonate 600 mg calcium (1,500 mg) tablet, Take 1 tablet (1,500 mg) by mouth 2 times a day., Disp: , Rfl:     cetirizine (ZyrTEC) 10 mg capsule, Take 1 capsule (10 mg) by mouth once daily at bedtime., Disp: , Rfl:     cholecalciferol (Vitamin D-3) 50 MCG (2000 UT) tablet, Take 1 tablet (2,000 Units) by mouth once daily., Disp: , Rfl:     DILT- mg 24 hr capsule, Take 1 capsule (180 mg) by mouth once daily., Disp: 90 capsule, Rfl: 3    diphenhydrAMINE-acetaminophen (Tylenol PM Extra Strength)  mg per tablet, Take by mouth., Disp: , Rfl:     fluticasone (Flonase Allergy Relief) 50 mcg/actuation nasal spray, once every 24 hours., Disp: , Rfl:     levothyroxine (Synthroid, Levoxyl) 75 mcg tablet, Take 1 tablet (75 mcg) by mouth once daily., Disp: 90 tablet, Rfl: 1    lisinopril 20 mg tablet, Take 1 tablet (20 mg) by mouth 2 times a day., Disp:  180 tablet, Rfl: 3    loperamide (Imodium) 2 mg capsule, Take by mouth., Disp: , Rfl:     magnesium 250 mg tablet, Take by mouth., Disp: , Rfl:     naproxen sodium (Aleve) 220 mg tablet, every 12 hours., Disp: , Rfl:     nitroglycerin (Nitrostat) 0.4 mg SL tablet, Place 1 tablet (0.4 mg) under the tongue. As directed may be repeated every 5 minutes up to a maximum of 3 doses in a 15 minute period., Disp: , Rfl:     pravastatin (Pravachol) 40 mg tablet, TAKE ONE TABLET BY MOUTH EVERY DAY, Disp: 90 tablet, Rfl: 1    sotalol (Betapace) 120 mg tablet, Take 1 tablet (120 mg) by mouth 2 times a day., Disp: 180 tablet, Rfl: 3    venlafaxine XR (Effexor-XR) 75 mg 24 hr capsule, take 1 capsule by mouth once daily, Disp: 90 capsule, Rfl: 1    vit A,C and E-lutein-minerals (Ocuvite with Lutein) 300 mcg-200 mg-27 mg-2 mg tablet, Take 1 tablet by mouth once daily., Disp: , Rfl:     VITAMIN B COMPLEX ORAL, Take 1 tablet by mouth once daily., Disp: , Rfl:     albuterol (ProAir HFA) 90 mcg/actuation inhaler, Inhale 2 puffs every 4 hours if needed for wheezing or shortness of breath., Disp: 8.5 g, Rfl: 5    fluticasone propionate (ArmonAir Digihaler) 232 mcg/actuation aero powdr breath act w/sensor, Inhale twice a day. (Patient not taking: Reported on 11/27/2024), Disp: , Rfl:     ibuprofen 200 mg tablet, every 8 hours. (Patient not taking: Reported on 11/27/2024), Disp: , Rfl:     ipratropium (Atrovent) 42 mcg (0.06 %) nasal spray, Administer 2 sprays into each nostril 2 times a day for 21 days., Disp: 15 mL, Rfl: 1    moisturizing mouth (Biotene Oral Dry Mouth) solution, Take 2 sprays by mouth if needed (dry mouth). (Patient not taking: Reported on 11/27/2024), Disp: 44.3 mL, Rfl: 1    Assessment/Plan   Diagnoses and all orders for this visit:  Bronchitis  -     methylPREDNISolone (Medrol Dospak) 4 mg tablets; Take as directed on package.  -     doxycycline (Vibramycin) 100 mg capsule; Take 1 capsule (100 mg) by mouth 2 times a  day for 7 days. Take with at least 8 ounces (large glass) of water, do not lie down for 30 minutes after  -     benzonatate (Tessalon) 200 mg capsule; Take 1 capsule (200 mg) by mouth 3 times a day as needed for cough. Do not crush or chew.  -     fluticasone propionate (ArmonAir Digihaler) 232 mcg/actuation aero powdr breath act w/sensor; Inhale 2 puffs 2 times a day.  Acute cough  Comments:  likely allergy related   recommend tessalon andreas, antihistamines recommended   albuterol sent  Orders:  -     albuterol (ProAir HFA) 90 mcg/actuation inhaler; Inhale 2 puffs every 4 hours if needed for wheezing or shortness of breath.  -     fluticasone propionate (ArmonAir Digihaler) 232 mcg/actuation aero powdr breath act w/sensor; Inhale 2 puffs 2 times a day.           Virginia Brown,  11/27/24 1:11 PM

## 2024-12-06 DIAGNOSIS — E03.9 HYPOTHYROIDISM, UNSPECIFIED TYPE: ICD-10-CM

## 2024-12-09 RX ORDER — LEVOTHYROXINE SODIUM 75 UG/1
75 TABLET ORAL DAILY
Qty: 30 TABLET | Refills: 0 | Status: SHIPPED | OUTPATIENT
Start: 2024-12-09

## 2025-02-04 ENCOUNTER — APPOINTMENT (OUTPATIENT)
Dept: PRIMARY CARE | Facility: CLINIC | Age: 80
End: 2025-02-04
Payer: MEDICARE

## 2025-02-04 VITALS
BODY MASS INDEX: 31.56 KG/M2 | WEIGHT: 189.4 LBS | HEART RATE: 72 BPM | SYSTOLIC BLOOD PRESSURE: 94 MMHG | HEIGHT: 65 IN | DIASTOLIC BLOOD PRESSURE: 54 MMHG | OXYGEN SATURATION: 98 %

## 2025-02-04 DIAGNOSIS — K12.1 MOUTH ULCERATION: ICD-10-CM

## 2025-02-04 DIAGNOSIS — R05.3 CHRONIC COUGH: ICD-10-CM

## 2025-02-04 DIAGNOSIS — G47.00 INSOMNIA, UNSPECIFIED TYPE: ICD-10-CM

## 2025-02-04 DIAGNOSIS — I10 HYPERTENSION, UNSPECIFIED TYPE: Primary | ICD-10-CM

## 2025-02-04 DIAGNOSIS — E03.9 HYPOTHYROIDISM, UNSPECIFIED TYPE: ICD-10-CM

## 2025-02-04 DIAGNOSIS — J30.9 ALLERGIC RHINITIS, UNSPECIFIED SEASONALITY, UNSPECIFIED TRIGGER: ICD-10-CM

## 2025-02-04 PROCEDURE — 3078F DIAST BP <80 MM HG: CPT | Performed by: STUDENT IN AN ORGANIZED HEALTH CARE EDUCATION/TRAINING PROGRAM

## 2025-02-04 PROCEDURE — 1160F RVW MEDS BY RX/DR IN RCRD: CPT | Performed by: STUDENT IN AN ORGANIZED HEALTH CARE EDUCATION/TRAINING PROGRAM

## 2025-02-04 PROCEDURE — 1123F ACP DISCUSS/DSCN MKR DOCD: CPT | Performed by: STUDENT IN AN ORGANIZED HEALTH CARE EDUCATION/TRAINING PROGRAM

## 2025-02-04 PROCEDURE — G2211 COMPLEX E/M VISIT ADD ON: HCPCS | Performed by: STUDENT IN AN ORGANIZED HEALTH CARE EDUCATION/TRAINING PROGRAM

## 2025-02-04 PROCEDURE — 3074F SYST BP LT 130 MM HG: CPT | Performed by: STUDENT IN AN ORGANIZED HEALTH CARE EDUCATION/TRAINING PROGRAM

## 2025-02-04 PROCEDURE — 99214 OFFICE O/P EST MOD 30 MIN: CPT | Performed by: STUDENT IN AN ORGANIZED HEALTH CARE EDUCATION/TRAINING PROGRAM

## 2025-02-04 PROCEDURE — 1159F MED LIST DOCD IN RCRD: CPT | Performed by: STUDENT IN AN ORGANIZED HEALTH CARE EDUCATION/TRAINING PROGRAM

## 2025-02-04 PROCEDURE — 1157F ADVNC CARE PLAN IN RCRD: CPT | Performed by: STUDENT IN AN ORGANIZED HEALTH CARE EDUCATION/TRAINING PROGRAM

## 2025-02-04 RX ORDER — LEVOTHYROXINE SODIUM 75 UG/1
75 TABLET ORAL DAILY
Qty: 90 TABLET | Refills: 1 | Status: SHIPPED | OUTPATIENT
Start: 2025-02-04

## 2025-02-04 RX ORDER — AMITRIPTYLINE HYDROCHLORIDE 10 MG/1
10 TABLET, FILM COATED ORAL NIGHTLY
Qty: 90 TABLET | Refills: 1 | Status: SHIPPED | OUTPATIENT
Start: 2025-02-04 | End: 2026-02-04

## 2025-02-04 RX ORDER — FAMOTIDINE 40 MG/1
40 TABLET, FILM COATED ORAL NIGHTLY
Qty: 30 TABLET | Refills: 1 | Status: SHIPPED | OUTPATIENT
Start: 2025-02-04 | End: 2025-08-03

## 2025-02-04 RX ORDER — BENZONATATE 200 MG/1
200 CAPSULE ORAL 3 TIMES DAILY PRN
COMMUNITY
End: 2025-02-04 | Stop reason: WASHOUT

## 2025-02-04 RX ORDER — DICLOFENAC SODIUM 10 MG/G
4 GEL TOPICAL 4 TIMES DAILY PRN
COMMUNITY

## 2025-02-04 RX ORDER — GUAIFENESIN 400 MG/1
400 TABLET ORAL 2 TIMES DAILY
COMMUNITY
End: 2025-02-04 | Stop reason: WASHOUT

## 2025-02-04 RX ORDER — FAMOTIDINE 40 MG/1
40 TABLET, FILM COATED ORAL 2 TIMES DAILY
Qty: 60 TABLET | Refills: 5 | Status: SHIPPED | OUTPATIENT
Start: 2025-02-04 | End: 2025-02-04

## 2025-02-04 NOTE — PATIENT INSTRUCTIONS
Let's restart your amitriptyline for your sleep and chronic pain please stop the tylenol PM.     For your mucus and cough, start the new inhaler twice daily and pepcid at night time . Stop your mucus relief medications:  guaifenesin based products and tessalon pearls. Continue to use your albuterol as a rescue.     For your mouth ulcerations, I sent in a magic mouth wash.     Let's check back in next month. We will talk then about your pain medications: tylenol and NSAIDs

## 2025-02-04 NOTE — PROGRESS NOTES
Subjective   Patient ID: Natividad Tena is a 79 y.o. female who presents for Med Refill (Pt is here for medication refills and questions about them.).  HPI   Has been having some mouth ulcers. Denies any changes to her diet, no increase in citrus or vinegar.     Cough and phlegm/post nasal drip    Having some increased mucus but currently taking daily mucinex/gucinesin  Tessalon pearls   Zyrtec   Taking   Medications:     Steroid inhaler worked well her cough at night     Naproxen, volteran, ibuprofen    Currently using iburprofen and acetamenophen     Current Outpatient Medications:     acetaminophen (Tylenol) 500 mg tablet, take 2 tablets every 8 hours for 5 days, after 5 days take 2 tablets every 8 hours only as needed for pain, Disp: , Rfl:     albuterol (ProAir HFA) 90 mcg/actuation inhaler, Inhale 2 puffs every 4 hours if needed for wheezing or shortness of breath., Disp: 8.5 g, Rfl: 5    aspirin 81 mg EC tablet, Take 1 tablet (81 mg) by mouth every other day., Disp: , Rfl:     calcium carbonate 600 mg calcium (1,500 mg) tablet, Take 1 tablet (1,500 mg) by mouth 2 times a day., Disp: , Rfl:     cetirizine (ZyrTEC) 10 mg capsule, Take 1 capsule (10 mg) by mouth once daily at bedtime., Disp: , Rfl:     cholecalciferol (Vitamin D-3) 50 MCG (2000 UT) tablet, Take 1 tablet (2,000 Units) by mouth once daily., Disp: , Rfl:     DILT- mg 24 hr capsule, Take 1 capsule (180 mg) by mouth once daily., Disp: 90 capsule, Rfl: 3    ibuprofen 200 mg tablet, every 8 hours., Disp: , Rfl:     lisinopril 20 mg tablet, Take 1 tablet (20 mg) by mouth 2 times a day., Disp: 180 tablet, Rfl: 3    loperamide (Imodium) 2 mg capsule, Take by mouth., Disp: , Rfl:     magnesium 250 mg tablet, Take by mouth., Disp: , Rfl:     moisturizing mouth (Biotene Oral Dry Mouth) solution, Take 2 sprays by mouth if needed (dry mouth)., Disp: 44.3 mL, Rfl: 1    naproxen sodium (Aleve) 220 mg tablet, every 12 hours., Disp: , Rfl:     nitroglycerin  (Nitrostat) 0.4 mg SL tablet, Place 1 tablet (0.4 mg) under the tongue. As directed may be repeated every 5 minutes up to a maximum of 3 doses in a 15 minute period., Disp: , Rfl:     pravastatin (Pravachol) 40 mg tablet, TAKE ONE TABLET BY MOUTH EVERY DAY, Disp: 90 tablet, Rfl: 1    sotalol (Betapace) 120 mg tablet, Take 1 tablet (120 mg) by mouth 2 times a day., Disp: 180 tablet, Rfl: 3    venlafaxine XR (Effexor-XR) 75 mg 24 hr capsule, take 1 capsule by mouth once daily, Disp: 90 capsule, Rfl: 1    vit A,C and E-lutein-minerals (Ocuvite with Lutein) 300 mcg-200 mg-27 mg-2 mg tablet, Take 1 tablet by mouth once daily., Disp: , Rfl:     VITAMIN B COMPLEX ORAL, Take 1 tablet by mouth once daily., Disp: , Rfl:     amitriptyline (Elavil) 10 mg tablet, Take 1 tablet (10 mg) by mouth once daily at bedtime., Disp: 90 tablet, Rfl: 1    budesonide (Pulmicort) 90 mcg/actuation inhaler, Inhale 1 puff 2 times a day. Rinse mouth with water after use to reduce aftertaste and incidence of candidiasis. Do not swallow., Disp: 1 each, Rfl: 11    diclofenac sodium (Voltaren Arthritis Pain) 1 % gel, Apply 4.5 inches (4 g) topically 4 times a day as needed., Disp: , Rfl:     diphenhydramine/Maalox/lidocaine (Magic Mouthwash) - Compounded - Outpatient, Swish and spit 10 mL 4 times a day as needed (mouth ulcer)., Disp: 120 mL, Rfl: 0    famotidine (Pepcid) 40 mg tablet, Take 1 tablet (40 mg) by mouth once daily at bedtime., Disp: 30 tablet, Rfl: 1    fluticasone (Flonase Allergy Relief) 50 mcg/actuation nasal spray, once every 24 hours. (Patient not taking: Reported on 2/4/2025), Disp: , Rfl:     fluticasone furoate (Arnuity Ellipta) 100 mcg/actuation inhaler, Inhale 1 puff once daily. Rinse mouth with water after use to reduce aftertaste and incidence of candidiasis. Do not swallow., Disp: 1 each, Rfl: 11    levothyroxine (Synthroid, Levoxyl) 75 mcg tablet, Take 1 tablet (75 mcg) by mouth once daily., Disp: 90 tablet, Rfl: 1    Visit  "Vitals  BP 94/54   Pulse 72   Ht 1.651 m (5' 5\")   Wt 85.9 kg (189 lb 6.4 oz)   LMP  (LMP Unknown)   SpO2 98%   BMI 31.52 kg/m²   OB Status Postmenopausal   Smoking Status Never   BSA 1.98 m²       Objective   Physical Exam  Vitals reviewed.   Constitutional:       Appearance: Normal appearance.   Cardiovascular:      Rate and Rhythm: Normal rate and regular rhythm.      Heart sounds: No murmur heard.  Pulmonary:      Effort: Pulmonary effort is normal. No respiratory distress.      Breath sounds: Normal breath sounds. No wheezing.   Musculoskeletal:      Cervical back: Neck supple.      Left lower leg: No edema.   Neurological:      Mental Status: She is alert.         Assessment/Plan   Diagnoses and all orders for this visit:  Hypertension, unspecified type  -     Comprehensive Metabolic Panel; Future  -     CBC and Auto Differential; Future  Hypothyroidism, unspecified type  -     levothyroxine (Synthroid, Levoxyl) 75 mcg tablet; Take 1 tablet (75 mcg) by mouth once daily.  -     TSH with reflex to Free T4 if abnormal; Future  Mouth ulceration  -     diphenhydramine/Maalox/lidocaine (Magic Mouthwash) - Compounded - Outpatient; Swish and spit 10 mL 4 times a day as needed (mouth ulcer).  Allergic rhinitis, unspecified seasonality, unspecified trigger  -     budesonide (Pulmicort) 90 mcg/actuation inhaler; Inhale 1 puff 2 times a day. Rinse mouth with water after use to reduce aftertaste and incidence of candidiasis. Do not swallow.  Chronic cough  -     budesonide (Pulmicort) 90 mcg/actuation inhaler; Inhale 1 puff 2 times a day. Rinse mouth with water after use to reduce aftertaste and incidence of candidiasis. Do not swallow.  -     famotidine (Pepcid) 40 mg tablet; Take 1 tablet (40 mg) by mouth once daily at bedtime.  Insomnia, unspecified type  -     amitriptyline (Elavil) 10 mg tablet; Take 1 tablet (10 mg) by mouth once daily at bedtime.    Natividad Tena is a 79 year old female who presents to the office for " HTN,          Let's restart your amitriptyline for your sleep and chronic pain please stop the tylenol PM.     For your mucus and cough, start the new inhaler twice daily and pepcid at night time . Stop your mucus relief medications:  guaifenesin based products and tessalon pearls. Continue to use your albuterol as a rescue.     For your mouth ulcerations, I sent in a magic mouth wash.     Let's check back in next month. We will talk then about your pain medications: tylenol and NSAIDs    HTN/palpitations   Following with cardiology   Controlled with diltiazem and sotalol  Stress test 2/23 wnl      Hypothyroidism   Levothyroxine refilled     Anxiety/depression  Stable   Continue effexor 75mg daily      ALLEGRA   Has been sleeping in her chair since her clavical fracture   Normally compliant with CPAP machine   Auto pap 5-15 with supplies from [employer]   BMI 30.85kg/m2      Pt declines MWV     Health Maintenance   Topic Date Due    Zoster Vaccines (2 of 3) 02/26/2011    Pneumococcal Vaccine (2 of 2 - PPSV23) 02/14/2019    Diabetes Screening  05/15/2019    Medicare Annual Wellness Visit (AWV)  03/04/2023    TSH Level  02/04/2026    DTaP/Tdap/Td Vaccines (2 - Td or Tdap) 03/29/2027    Lipid Panel  09/27/2029    RSV High Risk: (Elderly (60+) or Pregnant Population)  Completed    Influenza Vaccine  Completed    Bone Density Scan  Completed    COVID-19 Vaccine  Completed    HIB Vaccines  Aged Out    Hepatitis B Vaccines  Aged Out    IPV Vaccines  Aged Out    Hepatitis A Vaccines  Aged Out    Meningococcal Vaccine  Aged Out    Rotavirus Vaccines  Aged Out    HPV Vaccines  Aged Out    Irritable Bowel Syndrome  Discontinued            Virginia Brown DO 02/27/25 7:03 PM

## 2025-02-05 ENCOUNTER — TELEPHONE (OUTPATIENT)
Dept: PRIMARY CARE | Facility: CLINIC | Age: 80
End: 2025-02-05
Payer: MEDICARE

## 2025-02-05 LAB
ALBUMIN SERPL-MCNC: 4.3 G/DL (ref 3.6–5.1)
ALP SERPL-CCNC: 66 U/L (ref 37–153)
ALT SERPL-CCNC: 13 U/L (ref 6–29)
ANION GAP SERPL CALCULATED.4IONS-SCNC: 10 MMOL/L (CALC) (ref 7–17)
AST SERPL-CCNC: 16 U/L (ref 10–35)
BASOPHILS # BLD AUTO: 71 CELLS/UL (ref 0–200)
BASOPHILS NFR BLD AUTO: 1.2 %
BILIRUB SERPL-MCNC: 0.6 MG/DL (ref 0.2–1.2)
BUN SERPL-MCNC: 27 MG/DL (ref 7–25)
CALCIUM SERPL-MCNC: 9.4 MG/DL (ref 8.6–10.4)
CHLORIDE SERPL-SCNC: 104 MMOL/L (ref 98–110)
CO2 SERPL-SCNC: 26 MMOL/L (ref 20–32)
CREAT SERPL-MCNC: 1.27 MG/DL (ref 0.6–0.95)
EGFRCR SERPLBLD CKD-EPI 2021: 43 ML/MIN/1.73M2
EOSINOPHIL # BLD AUTO: 89 CELLS/UL (ref 15–500)
EOSINOPHIL NFR BLD AUTO: 1.5 %
ERYTHROCYTE [DISTWIDTH] IN BLOOD BY AUTOMATED COUNT: 13.6 % (ref 11–15)
GLUCOSE SERPL-MCNC: 81 MG/DL (ref 65–139)
HCT VFR BLD AUTO: 38 % (ref 35–45)
HGB BLD-MCNC: 12.3 G/DL (ref 11.7–15.5)
LYMPHOCYTES # BLD AUTO: 1033 CELLS/UL (ref 850–3900)
LYMPHOCYTES NFR BLD AUTO: 17.5 %
MCH RBC QN AUTO: 29.7 PG (ref 27–33)
MCHC RBC AUTO-ENTMCNC: 32.4 G/DL (ref 32–36)
MCV RBC AUTO: 91.8 FL (ref 80–100)
MONOCYTES # BLD AUTO: 543 CELLS/UL (ref 200–950)
MONOCYTES NFR BLD AUTO: 9.2 %
NEUTROPHILS # BLD AUTO: 4165 CELLS/UL (ref 1500–7800)
NEUTROPHILS NFR BLD AUTO: 70.6 %
PLATELET # BLD AUTO: 244 THOUSAND/UL (ref 140–400)
PMV BLD REES-ECKER: 11.9 FL (ref 7.5–12.5)
POTASSIUM SERPL-SCNC: 4.6 MMOL/L (ref 3.5–5.3)
PROT SERPL-MCNC: 6.4 G/DL (ref 6.1–8.1)
RBC # BLD AUTO: 4.14 MILLION/UL (ref 3.8–5.1)
SODIUM SERPL-SCNC: 140 MMOL/L (ref 135–146)
TSH SERPL-ACNC: 1.86 MIU/L (ref 0.4–4.5)
WBC # BLD AUTO: 5.9 THOUSAND/UL (ref 3.8–10.8)

## 2025-02-05 NOTE — TELEPHONE ENCOUNTER
Pharmacy called pt stating they will not cover Pulmicort but will cover Arnuity Elippa. Pt would like you to send new rx for this [facility]   [id number] [place] [facility]

## 2025-02-06 DIAGNOSIS — R05.3 CHRONIC COUGH: Primary | ICD-10-CM

## 2025-02-06 RX ORDER — FLUTICASONE FUROATE 100 UG/1
1 POWDER RESPIRATORY (INHALATION) DAILY
Qty: 1 EACH | Refills: 11 | Status: SHIPPED | OUTPATIENT
Start: 2025-02-06 | End: 2026-02-06

## 2025-02-07 NOTE — TELEPHONE ENCOUNTER
Called pt to let her know Yandel Nevarez had been called in. Pt stated pharmacy said it would cost $200.00. Pt chose to go without, see how she does and will review  with you at her appt next month.

## 2025-03-06 ENCOUNTER — APPOINTMENT (OUTPATIENT)
Dept: PRIMARY CARE | Facility: CLINIC | Age: 80
End: 2025-03-06
Payer: MEDICARE

## 2025-03-06 VITALS
HEIGHT: 65 IN | WEIGHT: 194.2 LBS | SYSTOLIC BLOOD PRESSURE: 110 MMHG | DIASTOLIC BLOOD PRESSURE: 62 MMHG | BODY MASS INDEX: 32.36 KG/M2 | HEART RATE: 65 BPM | OXYGEN SATURATION: 97 %

## 2025-03-06 DIAGNOSIS — J44.9 CHRONIC OBSTRUCTIVE PULMONARY DISEASE, UNSPECIFIED COPD TYPE (MULTI): ICD-10-CM

## 2025-03-06 DIAGNOSIS — I48.11 LONGSTANDING PERSISTENT ATRIAL FIBRILLATION (MULTI): ICD-10-CM

## 2025-03-06 DIAGNOSIS — R05.3 CHRONIC COUGH: Primary | ICD-10-CM

## 2025-03-06 DIAGNOSIS — N18.31 CHRONIC KIDNEY DISEASE, STAGE 3A (MULTI): ICD-10-CM

## 2025-03-06 PROBLEM — R05.9 COUGH: Status: RESOLVED | Noted: 2023-03-04 | Resolved: 2025-03-06

## 2025-03-06 PROBLEM — M54.16 ACUTE LEFT LUMBAR RADICULOPATHY: Status: RESOLVED | Noted: 2023-09-05 | Resolved: 2025-03-06

## 2025-03-06 PROCEDURE — 3078F DIAST BP <80 MM HG: CPT | Performed by: STUDENT IN AN ORGANIZED HEALTH CARE EDUCATION/TRAINING PROGRAM

## 2025-03-06 PROCEDURE — G2211 COMPLEX E/M VISIT ADD ON: HCPCS | Performed by: STUDENT IN AN ORGANIZED HEALTH CARE EDUCATION/TRAINING PROGRAM

## 2025-03-06 PROCEDURE — 1160F RVW MEDS BY RX/DR IN RCRD: CPT | Performed by: STUDENT IN AN ORGANIZED HEALTH CARE EDUCATION/TRAINING PROGRAM

## 2025-03-06 PROCEDURE — 99214 OFFICE O/P EST MOD 30 MIN: CPT | Performed by: STUDENT IN AN ORGANIZED HEALTH CARE EDUCATION/TRAINING PROGRAM

## 2025-03-06 PROCEDURE — 1159F MED LIST DOCD IN RCRD: CPT | Performed by: STUDENT IN AN ORGANIZED HEALTH CARE EDUCATION/TRAINING PROGRAM

## 2025-03-06 PROCEDURE — 1123F ACP DISCUSS/DSCN MKR DOCD: CPT | Performed by: STUDENT IN AN ORGANIZED HEALTH CARE EDUCATION/TRAINING PROGRAM

## 2025-03-06 PROCEDURE — 1157F ADVNC CARE PLAN IN RCRD: CPT | Performed by: STUDENT IN AN ORGANIZED HEALTH CARE EDUCATION/TRAINING PROGRAM

## 2025-03-06 PROCEDURE — 3074F SYST BP LT 130 MM HG: CPT | Performed by: STUDENT IN AN ORGANIZED HEALTH CARE EDUCATION/TRAINING PROGRAM

## 2025-03-06 NOTE — PROGRESS NOTES
Subjective   Patient ID: Natividad Tena is a 79 y.o. female who presents for Follow-up (Pt is here for a 1 mo follow up.).  HPI  Last visit we added in amitriptyline for her sleep and chronic pain. Working well except for her cough. Noticing a decrease to 1-2 time night time wakening phlegm with the famotidine.     Pt has not been taking any of her inhalers except for the rescue inhaler due to cost.   Cough doesn't really start until 9pm until the plegm  Currently taking the zyrtec, flonase,     ENT evaluation       Current Outpatient Medications:   •  acetaminophen (Tylenol) 500 mg tablet, take 2 tablets every 8 hours for 5 days, after 5 days take 2 tablets every 8 hours only as needed for pain, Disp: , Rfl:   •  albuterol (ProAir HFA) 90 mcg/actuation inhaler, Inhale 2 puffs every 4 hours if needed for wheezing or shortness of breath., Disp: 8.5 g, Rfl: 5  •  amitriptyline (Elavil) 10 mg tablet, Take 1 tablet (10 mg) by mouth once daily at bedtime., Disp: 90 tablet, Rfl: 1  •  aspirin 81 mg EC tablet, Take 1 tablet (81 mg) by mouth every other day., Disp: , Rfl:   •  calcium carbonate 600 mg calcium (1,500 mg) tablet, Take 1 tablet (1,500 mg) by mouth 2 times a day., Disp: , Rfl:   •  cetirizine (ZyrTEC) 10 mg capsule, Take 1 capsule (10 mg) by mouth once daily at bedtime., Disp: , Rfl:   •  cholecalciferol (Vitamin D-3) 50 MCG (2000 UT) tablet, Take 1 tablet (2,000 Units) by mouth once daily., Disp: , Rfl:   •  diclofenac sodium (Voltaren Arthritis Pain) 1 % gel, Apply 4.5 inches (4 g) topically 4 times a day as needed., Disp: , Rfl:   •  DILT- mg 24 hr capsule, Take 1 capsule (180 mg) by mouth once daily., Disp: 90 capsule, Rfl: 3  •  famotidine (Pepcid) 40 mg tablet, Take 1 tablet (40 mg) by mouth once daily at bedtime., Disp: 30 tablet, Rfl: 1  •  fluticasone (Flonase Allergy Relief) 50 mcg/actuation nasal spray, once every 24 hours., Disp: , Rfl:   •  ibuprofen 200 mg tablet, every 8 hours., Disp: , Rfl:    •  levothyroxine (Synthroid, Levoxyl) 75 mcg tablet, Take 1 tablet (75 mcg) by mouth once daily., Disp: 90 tablet, Rfl: 1  •  lisinopril 20 mg tablet, Take 1 tablet (20 mg) by mouth 2 times a day., Disp: 180 tablet, Rfl: 3  •  loperamide (Imodium) 2 mg capsule, Take by mouth., Disp: , Rfl:   •  magnesium 250 mg tablet, Take by mouth., Disp: , Rfl:   •  moisturizing mouth (Biotene Oral Dry Mouth) solution, Take 2 sprays by mouth if needed (dry mouth)., Disp: 44.3 mL, Rfl: 1  •  naproxen sodium (Aleve) 220 mg tablet, every 12 hours., Disp: , Rfl:   •  nitroglycerin (Nitrostat) 0.4 mg SL tablet, Place 1 tablet (0.4 mg) under the tongue. As directed may be repeated every 5 minutes up to a maximum of 3 doses in a 15 minute period., Disp: , Rfl:   •  pravastatin (Pravachol) 40 mg tablet, TAKE ONE TABLET BY MOUTH EVERY DAY, Disp: 90 tablet, Rfl: 1  •  sotalol (Betapace) 120 mg tablet, Take 1 tablet (120 mg) by mouth 2 times a day., Disp: 180 tablet, Rfl: 3  •  venlafaxine XR (Effexor-XR) 75 mg 24 hr capsule, take 1 capsule by mouth once daily, Disp: 90 capsule, Rfl: 1  •  vit A,C and E-lutein-minerals (Ocuvite with Lutein) 300 mcg-200 mg-27 mg-2 mg tablet, Take 1 tablet by mouth once daily., Disp: , Rfl:   •  VITAMIN B COMPLEX ORAL, Take 1 tablet by mouth once daily., Disp: , Rfl:   •  budesonide (Pulmicort) 90 mcg/actuation inhaler, Inhale 1 puff 2 times a day. Rinse mouth with water after use to reduce aftertaste and incidence of candidiasis. Do not swallow. (Patient not taking: Reported on 3/6/2025), Disp: 1 each, Rfl: 11  •  diphenhydramine/Maalox/lidocaine (Magic Mouthwash) - Compounded - Outpatient, Swish and spit 10 mL 4 times a day as needed (mouth ulcer). (Patient not taking: Reported on 3/6/2025), Disp: 120 mL, Rfl: 0  •  fluticasone furoate (Arnuity Ellipta) 100 mcg/actuation inhaler, Inhale 1 puff once daily. Rinse mouth with water after use to reduce aftertaste and incidence of candidiasis. Do not swallow.  "(Patient not taking: Reported on 3/6/2025), Disp: 1 each, Rfl: 11    Visit Vitals  BP (!) 71/40   Pulse 65   Ht 1.651 m (5' 5\")   Wt 88.1 kg (194 lb 3.2 oz)   LMP  (LMP Unknown)   SpO2 97%   BMI 32.32 kg/m²   OB Status Postmenopausal   Smoking Status Never   BSA 2.01 m²       Objective   Physical Exam  Vitals reviewed.   Constitutional:       Appearance: Normal appearance.   Cardiovascular:      Rate and Rhythm: Normal rate and regular rhythm.      Heart sounds: Murmur heard.   Pulmonary:      Effort: Pulmonary effort is normal. No respiratory distress.      Breath sounds: Normal breath sounds. No wheezing.   Musculoskeletal:      Cervical back: Neck supple.      Left lower leg: No edema.   Neurological:      Mental Status: She is alert.       Assessment/Plan   Diagnoses and all orders for this visit:  Chronic cough  -     Referral to Allergy; Future    Let's restart your amitriptyline for your sleep and chronic pain please stop the tylenol PM.      For your mucus and cough, start the new inhaler twice daily and pepcid at night time . Stop your mucus relief medications:  guaifenesin based products and tessalon pearls. Continue to use your albuterol as a rescue.      For your mouth ulcerations, I sent in a magic mouth wash.      Let's check back in next month. We will talk then about your pain medications: tylenol and NSAIDs     HTN/palpitations   Following with cardiology   Controlled with diltiazem and sotalol  Stress test 2/23 wnl      Hypothyroidism   Levothyroxine refilled     Anxiety/depression  Stable   Continue effexor 75mg daily      ALLEGRA   Has been sleeping in her chair since her clavical fracture   Normally compliant with CPAP machine   Auto pap 5-15 with supplies from [facility]   BMI 32.32kg/m2      Pt declines MWV        Virginia Brown, DO 03/06/25 1:34 PM   "

## 2025-03-24 DIAGNOSIS — I10 ESSENTIAL HYPERTENSION: ICD-10-CM

## 2025-03-24 DIAGNOSIS — I48.11 LONGSTANDING PERSISTENT ATRIAL FIBRILLATION (MULTI): ICD-10-CM

## 2025-03-25 RX ORDER — DILTIAZEM HYDROCHLORIDE 180 MG/1
180 CAPSULE, EXTENDED RELEASE ORAL DAILY
Qty: 90 CAPSULE | Refills: 3 | Status: SHIPPED | OUTPATIENT
Start: 2025-03-25

## 2025-03-25 RX ORDER — SOTALOL HYDROCHLORIDE 120 MG/1
120 TABLET ORAL 2 TIMES DAILY
Qty: 180 TABLET | Refills: 3 | Status: SHIPPED | OUTPATIENT
Start: 2025-03-25 | End: 2026-03-20

## 2025-03-29 PROBLEM — N18.31 CHRONIC KIDNEY DISEASE, STAGE 3A (MULTI): Status: ACTIVE | Noted: 2025-03-29

## 2025-04-03 DIAGNOSIS — F41.9 ANXIETY: ICD-10-CM

## 2025-04-04 RX ORDER — VENLAFAXINE HYDROCHLORIDE 75 MG/1
75 CAPSULE, EXTENDED RELEASE ORAL DAILY
Qty: 90 CAPSULE | Refills: 1 | Status: SHIPPED | OUTPATIENT
Start: 2025-04-04

## 2025-04-10 DIAGNOSIS — E78.5 HYPERLIPIDEMIA, UNSPECIFIED HYPERLIPIDEMIA TYPE: ICD-10-CM

## 2025-04-10 RX ORDER — PRAVASTATIN SODIUM 40 MG/1
40 TABLET ORAL DAILY
Qty: 90 TABLET | Refills: 1 | Status: SHIPPED | OUTPATIENT
Start: 2025-04-10

## 2025-05-28 ENCOUNTER — APPOINTMENT (OUTPATIENT)
Age: 80
End: 2025-05-28
Payer: MEDICARE

## 2025-06-20 ENCOUNTER — APPOINTMENT (OUTPATIENT)
Dept: CARDIOLOGY | Facility: CLINIC | Age: 80
End: 2025-06-20
Payer: MEDICARE

## 2025-06-20 VITALS
WEIGHT: 191 LBS | OXYGEN SATURATION: 97 % | HEART RATE: 72 BPM | DIASTOLIC BLOOD PRESSURE: 56 MMHG | RESPIRATION RATE: 16 BRPM | BODY MASS INDEX: 31.78 KG/M2 | SYSTOLIC BLOOD PRESSURE: 122 MMHG

## 2025-06-20 DIAGNOSIS — I10 PRIMARY HYPERTENSION: ICD-10-CM

## 2025-06-20 DIAGNOSIS — E11.9 TYPE 2 DIABETES, DIET CONTROLLED: ICD-10-CM

## 2025-06-20 DIAGNOSIS — I10 ESSENTIAL HYPERTENSION: ICD-10-CM

## 2025-06-20 DIAGNOSIS — I07.1 SEVERE TRICUSPID REGURGITATION: Primary | ICD-10-CM

## 2025-06-20 DIAGNOSIS — E78.5 HYPERLIPIDEMIA, UNSPECIFIED HYPERLIPIDEMIA TYPE: ICD-10-CM

## 2025-06-20 DIAGNOSIS — R00.2 PALPITATIONS: ICD-10-CM

## 2025-06-20 DIAGNOSIS — R01.1 HEART MURMUR: ICD-10-CM

## 2025-06-20 DIAGNOSIS — R06.09 DOE (DYSPNEA ON EXERTION): ICD-10-CM

## 2025-06-20 DIAGNOSIS — I48.11 LONGSTANDING PERSISTENT ATRIAL FIBRILLATION (MULTI): ICD-10-CM

## 2025-06-20 PROCEDURE — 1159F MED LIST DOCD IN RCRD: CPT | Performed by: INTERNAL MEDICINE

## 2025-06-20 PROCEDURE — 1160F RVW MEDS BY RX/DR IN RCRD: CPT | Performed by: INTERNAL MEDICINE

## 2025-06-20 PROCEDURE — 3078F DIAST BP <80 MM HG: CPT | Performed by: INTERNAL MEDICINE

## 2025-06-20 PROCEDURE — 1126F AMNT PAIN NOTED NONE PRSNT: CPT | Performed by: INTERNAL MEDICINE

## 2025-06-20 PROCEDURE — 1036F TOBACCO NON-USER: CPT | Performed by: INTERNAL MEDICINE

## 2025-06-20 PROCEDURE — 99214 OFFICE O/P EST MOD 30 MIN: CPT | Performed by: INTERNAL MEDICINE

## 2025-06-20 PROCEDURE — 3074F SYST BP LT 130 MM HG: CPT | Performed by: INTERNAL MEDICINE

## 2025-06-20 PROCEDURE — G2211 COMPLEX E/M VISIT ADD ON: HCPCS | Performed by: INTERNAL MEDICINE

## 2025-06-20 RX ORDER — SOTALOL HYDROCHLORIDE 120 MG/1
120 TABLET ORAL 2 TIMES DAILY
Qty: 180 TABLET | Refills: 3 | Status: SHIPPED | OUTPATIENT
Start: 2025-06-20 | End: 2026-06-15

## 2025-06-20 RX ORDER — LISINOPRIL 20 MG/1
20 TABLET ORAL 2 TIMES DAILY
Qty: 180 TABLET | Refills: 3 | Status: SHIPPED | OUTPATIENT
Start: 2025-06-20 | End: 2026-06-15

## 2025-06-20 RX ORDER — ASPIRIN 81 MG/1
81 TABLET ORAL EVERY OTHER DAY
Qty: 45 TABLET | Refills: 3 | Status: SHIPPED | OUTPATIENT
Start: 2025-06-20 | End: 2026-06-20

## 2025-06-20 RX ORDER — PRAVASTATIN SODIUM 40 MG/1
40 TABLET ORAL DAILY
Qty: 90 TABLET | Refills: 3 | Status: SHIPPED | OUTPATIENT
Start: 2025-06-20 | End: 2026-06-20

## 2025-06-20 RX ORDER — DILTIAZEM HYDROCHLORIDE 180 MG/1
180 CAPSULE, EXTENDED RELEASE ORAL DAILY
Qty: 90 CAPSULE | Refills: 3 | Status: SHIPPED | OUTPATIENT
Start: 2025-06-20

## 2025-06-20 ASSESSMENT — LIFESTYLE VARIABLES
HOW MANY STANDARD DRINKS CONTAINING ALCOHOL DO YOU HAVE ON A TYPICAL DAY: PATIENT DOES NOT DRINK
HAVE YOU OR SOMEONE ELSE BEEN INJURED AS A RESULT OF YOUR DRINKING: NO
SKIP TO QUESTIONS 9-10: 1
AUDIT-C TOTAL SCORE: 0
HOW OFTEN DO YOU HAVE SIX OR MORE DRINKS ON ONE OCCASION: NEVER
AUDIT TOTAL SCORE: 0
HAS A RELATIVE, FRIEND, DOCTOR, OR ANOTHER HEALTH PROFESSIONAL EXPRESSED CONCERN ABOUT YOUR DRINKING OR SUGGESTED YOU CUT DOWN: NO
HOW OFTEN DO YOU HAVE A DRINK CONTAINING ALCOHOL: NEVER

## 2025-06-20 ASSESSMENT — ENCOUNTER SYMPTOMS
DEPRESSION: 0
LOSS OF SENSATION IN FEET: 0
OCCASIONAL FEELINGS OF UNSTEADINESS: 0

## 2025-06-20 ASSESSMENT — PAIN SCALES - GENERAL: PAINLEVEL_OUTOF10: 0-NO PAIN

## 2025-06-20 NOTE — LETTER
June 20, 2025     Fan Bland MD  [id number] [address]  [facility] [address]    Patient: Natividad Tena   YOB: 1945   Date of Visit: 6/20/2025       Dear Dr. Fan Bland MD:    Thank you for referring Natividad Tena to me for evaluation. Below are my notes for this consultation.  If you have questions, please do not hesitate to call me. I look forward to following your patient along with you.       Sincerely,     Julian Tejada MD      CC: No Recipients  ______________________________________________________________________________________      [facility] [place]        Subjective  Chief Complaint   Patient presents with   • Follow-up     Follow up      79-year-old female patient with multiple comorbid condition.  History of hypertension, hyperlipidemia history of negative nuclear stress test about 2 years ago.  Patient currently on multiple bronchodilators.  Normal ejection fraction by echocardiogram.  Stable cardiac wise.  No active chest pain tightness.  Patient currently on sotalol as well as aspirin.  Patient echocardiogram done finding moderate TR with mild MR seen.  So far asymptomatic.  Fairly active.  Patient had lab done about 4 months ago in February I reviewed independently BMP.  Creatinine is 1.2 with a GFR is 43.  LFT unremarkable.  WBC unremarkable as well as T-cell normal limit for hemoglobin A1c was not checked.  LDL was 92.    She has a past medical history of A-fib (Multi) (03/04/2023), Acute maxillary sinusitis, unspecified (01/25/2016), Allergic rhinitis, unspecified (12/07/2013), Anemia, unspecified (12/07/2013), Cough, unspecified (12/15/2015), Cough, unspecified (07/23/2014), Cough, unspecified (03/23/2015), MARTE (dyspnea on exertion) (09/05/2023), Dysphonia, Gastro-esophageal reflux disease without esophagitis (12/07/2013), Generalized abdominal pain (07/31/2014), Hyperlipidemia (03/04/2023), Hypertension (03/04/2023), Other cervical  disc displacement, unspecified cervical region, Palpitations (2023), Paroxysmal atrial fibrillation (Multi) (2023), Personal history of other diseases of the respiratory system (2014), Personal history of other diseases of the respiratory system (04/10/2014), Personal history of transient ischemic attack (TIA), and 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  She has a past surgical history that includes Bladder surgery (2014);  section, classic (2014); Hysterectomy (2014); and Eye surgery (2014).   No relevant family history has been documented for this patient.  Current Outpatient Medications   Medication Sig Dispense Refill   • acetaminophen (Tylenol) 500 mg tablet take 2 tablets every 8 hours for 5 days, after 5 days take 2 tablets every 8 hours only as needed for pain     • albuterol (ProAir HFA) 90 mcg/actuation inhaler Inhale 2 puffs every 4 hours if needed for wheezing or shortness of breath. 8.5 g 5   • amitriptyline (Elavil) 10 mg tablet Take 1 tablet (10 mg) by mouth once daily at bedtime. 90 tablet 1   • calcium carbonate 600 mg calcium (1,500 mg) tablet Take 1 tablet (1,500 mg) by mouth 2 times a day.     • cetirizine (ZyrTEC) 10 mg capsule Take 1 capsule (10 mg) by mouth once daily at bedtime.     • cholecalciferol (Vitamin D-3) 50 MCG (2000 UT) tablet Take 1 tablet (2,000 Units) by mouth once daily.     • ibuprofen 200 mg tablet every 8 hours.     • levothyroxine (Synthroid, Levoxyl) 75 mcg tablet Take 1 tablet (75 mcg) by mouth once daily. 90 tablet 1   • loperamide (Imodium) 2 mg capsule Take by mouth.     • magnesium 250 mg tablet Take by mouth.     • nitroglycerin (Nitrostat) 0.4 mg SL tablet Place 1 tablet (0.4 mg) under the tongue. As directed may be repeated every 5 minutes up to a maximum of 3 doses in a 15 minute period.     •  venlafaxine XR (Effexor-XR) 75 mg 24 hr capsule Take 1 capsule (75 mg) by mouth once daily. 90 capsule 1   • vit A,C and E-lutein-minerals (Ocuvite with Lutein) 300 mcg-200 mg-27 mg-2 mg tablet Take 1 tablet by mouth once daily.     • VITAMIN B COMPLEX ORAL Take 1 tablet by mouth once daily.     • aspirin 81 mg EC tablet Take 1 tablet (81 mg) by mouth every other day. 45 tablet 3   • diclofenac sodium (Voltaren Arthritis Pain) 1 % gel Apply 4.5 inches (4 g) topically 4 times a day as needed. (Patient not taking: Reported on 6/20/2025)     • DILT- mg 24 hr capsule Take 1 capsule (180 mg) by mouth once daily. 90 capsule 3   • lisinopril 20 mg tablet Take 1 tablet (20 mg) by mouth 2 times a day. 180 tablet 3   • pravastatin (Pravachol) 40 mg tablet Take 1 tablet (40 mg) by mouth once daily. 90 tablet 3   • sotalol (Betapace) 120 mg tablet Take 1 tablet (120 mg) by mouth 2 times a day. 180 tablet 3     No current facility-administered medications for this visit.      reports that she has never smoked. She has been exposed to tobacco smoke. She has never used smokeless tobacco. She reports that she does not currently use alcohol. She reports that she does not currently use drugs.  Allergies:  Bee pollen, Cefdinir, Ciprofloxacin, Iodinated contrast media, and Iodine    ROS: See HPI  CONSTITUTIONAL: Chills- none. Fever- none. Weight change appropriate for age.  HEENT: Headache- Negative.  Change in vision- none.  Ear pain- none. Nasal congestion- none. Post-nasal drip-none.  Sore throat-none.  CARDIOLOGY: Chest pain- none.  Leg edema-trace.  Murmurs-soft systolic.  Palpitation- none.  RESPIRATORY: Denies any shortness of breath.  GI: Abdominal pain- none.  Change in bowel habits- none.  Constipation- none.  Diarrhea- none.  Nausea- none.  Vomiting- none.  MUSCULOSKELETAL: Joint pain- none.  Muscle aches- none.  DERMATOLOGY: Rash- none.  NEUROLOGY: Dizziness- none.   Headache- none.  PSYCHIATRY: Denies any depression  "or anxiety     Vitals:    06/20/25 1440   BP: 122/56   Pulse: 72   Resp: 16   SpO2: 97%   Weight: 86.6 kg (191 lb)   PainSc: 0-No pain      BMI:Body mass index is 31.78 kg/m².   General Cardiology:  General Appearance: Alert, oriented and in no acute distress.  HEENT: extra ocular movements intact (EOMI), pupils equal,  round, reactive to light and accommodation (PERRLA).  Carotid Upstroke: no bruit, normal.  Jugular Venous Distention (JVD): flat.  Chest: normal.  Lungs: Clear to auscultation,   Heart Sounds: no S3 or S4, normal S1, S2, regular rate.  Murmur, Click, Gallop: soft systolic murmur.  Abdomen: no hepatomegaly, no masses felt, soft.  Extremities: no leg edema.  Peripheral pulses: 2 plus bilateral.  NEUROLOGY Cranial nerves II-XII grossly intact.     Last Labs:  CMP:  Recent Labs     02/04/25  1348 09/27/24  1306 11/11/22  1201    138 139   K 4.6 4.7 4.9    104 103   CO2 26 24 27   ANIONGAP 10 15 14   BUN 27* 26* 23   CREATININE 1.27* 1.01 0.91   EGFR 43* 57*  --    GLUCOSE 81 92 83     Recent Labs     02/04/25  1348 11/11/22  1201 08/30/21  1315   ALBUMIN 4.3 4.4 4.3   ALKPHOS 66 74 67   ALT 13 11 8   AST 16 17 13   BILITOT 0.6 0.6 0.5     CBC:  Recent Labs     02/04/25  1348 09/27/24  1306 11/11/22  1201   WBC 5.9 5.8 5.8   HGB 12.3 11.6* 12.1   HCT 38.0 37.2 38.6    216 226   MCV 91.8 97 97     COAG:   Recent Labs     10/22/18  1310   INR 1.1     HEME/ENDO:  Recent Labs     02/04/25  1348 11/11/22  1201 01/15/21  1121 12/26/19  1550 02/27/19  1630 05/15/18  0000   FERRITIN  --   --   --  84  --   --    IRONSAT  --   --   --  35  --   --    TSH 1.86 1.41 1.17 1.67   < > 2.23   HGBA1C  --   --   --   --   --  5.1    < > = values in this interval not displayed.      CARDIAC: No results for input(s): \"LDH\", \"CKMB\", \"TROPHS\", \"BNP\" in the last 02641 hours.    No lab exists for component: \"CK\", \"CKMBP\"  Recent Labs     09/27/24  1306 11/11/22  1201 01/15/21  1121 06/16/20  0949   CHOL 185 " 188 162 174   LDLF  --  91 78 87   LDLCALC 92  --   --   --    HDL 80.2 85.9 73.0 76.0   TRIG 65 58 55 53       Last Cardiology Tests:  Echo:  Echo Results:  Transthoracic Echo (TTE) Complete 04/03/2024    [facility]  [address] , [place] [id number]  Phone [phone]    TRANSTHORACIC ECHOCARDIOGRAM REPORT      Patient Name:      KIERSTEN HHAN        Reading Physician:    [id number] Tejada MD  Study Date:        4/3/2024             Ordering Provider:    [id number] TEJADA  MRN/PID:           [id number]             Fellow:  Accession#:        [id number]         Nurse:  Date of Birth/Age: 1945 / 78      Sonographer:          Rachael Wren RDCS  years  Gender:            F                    Additional Staff:     Ashlee Esqueda  Student  Height:            165.10 cm            Admit Date:           4/3/2024  Weight:            82.10 kg             Admission Status:     Outpatient  BSA / BMI:         1.90 m2 / 30.12      Department Location:  [facility]  kg/m2  Blood Pressure: 116 /61 mmHg    Study Type:    TRANSTHORACIC ECHO (TTE) COMPLETE  Diagnosis/ICD: Essential (primary) hypertension-I10  Indication:    HTN, AFIB  CPT Codes:     Echo Complete w Full Doppler-02892    Patient History:  Pertinent History: Previous SVT, HTN, Hyperlipidemia and A-Fib. TIA.    Study Detail: The following Echo studies were performed: 2D, M-Mode, Doppler and  color flow.      PHYSICIAN INTERPRETATION:  Left Ventricle: Left ventricular systolic function is normal, with an estimated ejection fraction of 60%. There are no regional wall motion abnormalities. The left ventricular cavity size is normal. Spectral Doppler shows an impaired relaxation pattern of left ventricular diastolic filling.  Left Atrium: The left atrium is upper limits of normal in size.  Right Ventricle: The right ventricle is normal in size. There is normal right ventricular global systolic function.  Right Atrium: The right  atrium is normal in size.  Aortic Valve: The aortic valve is probably trileaflet. There is evidence of mildly elevated transaortic gradients consistent with sclerosis of the aortic valve.  There is trivial aortic valve regurgitation. The peak instantaneous gradient of the aortic valve is 9.9 mmHg. The mean gradient of the aortic valve is 4.0 mmHg.  Mitral Valve: The mitral valve is mildly thickened. There is mild calcification of the posterior mitral valve leaflet. There is mild mitral annular calcification. There is trace to mild mitral valve regurgitation.  Tricuspid Valve: The tricuspid valve is structurally normal. There is mild to moderate tricuspid regurgitation.  Pulmonic Valve: The pulmonic valve is not well visualized. There is no indication of pulmonic valve regurgitation.  Pericardium: There is no pericardial effusion noted.  Aorta: The aortic root was not well visualized.      CONCLUSIONS:  1. Left ventricular systolic function is normal with a 60% estimated ejection fraction.  2. Spectral Doppler shows an impaired relaxation pattern of left ventricular diastolic filling.  3. Mild to moderate tricuspid regurgitation.  4. Aortic valve sclerosis.    QUANTITATIVE DATA SUMMARY:  2D MEASUREMENTS:  Normal Ranges:  LAs:           4.00 cm   (2.7-4.0cm)  IVSd:          1.30 cm   (0.6-1.1cm)  LVPWd:         1.32 cm   (0.6-1.1cm)  LVIDd:         3.91 cm   (3.9-5.9cm)  LVIDs:         2.27 cm  LV Mass Index: 96.3 g/m2  LV % FS        41.9 %    LA VOLUME:  Normal Ranges:  LA Vol A4C:        68.1 ml   (22+/-6mL/m2)  LA Vol Index A4C:  35.9ml/m2  LA Area A4C:       21.6 cm2  LA Major Axis A4C: 5.8 cm  LA Vol A4C:        64.1 ml    RA VOLUME BY A/L METHOD:  Normal Ranges:  RA Vol A4C:        40.0 ml    (8.3-19.5ml)  RA Vol Index A4C:  21.1 ml/m2  RA Area A4C:       15.1 cm2  RA Major Axis A4C: 4.8 cm    M-MODE MEASUREMENTS:  Normal Ranges:  Ao Root: 2.60 cm (2.0-3.7cm)  LAs:     3.70 cm (2.7-4.0cm)    LV SYSTOLIC  FUNCTION BY 2D PLANIMETRY (MOD):  Normal Ranges:  EF-A4C View: 60.2 % (>=55%)    LV DIASTOLIC FUNCTION:  Normal Ranges:  MV Peak E:        1.14 m/s   (0.7-1.2 m/s)  MV Peak A:        1.03 m/s   (0.42-0.7 m/s)  E/A Ratio:        1.11       (1.0-2.2)  MV lateral e'     0.06 m/s  MV medial e'      0.04 m/s  PulmV Sys Luther:    44.10 cm/s  PulmV Cote Luther:   51.40 cm/s  PulmV S/D Luther:    0.90  PulmV A Revs Luther: 20.90 cm/s    MITRAL VALVE:  Normal Ranges:  MV Vmax:    1.53 m/s (<=1.3m/s)  MV peak P.4 mmHg (<5mmHg)  MV mean PG: 3.0 mmHg (<48mmHg)  MV DT:      409 msec (150-240msec)    MITRAL INSUFFICIENCY:  Normal Ranges:  MR VTI:  212.00 cm  MR Vmax: 665.00 cm/s    AORTIC VALVE:  Normal Ranges:  AoV Vmax:      1.57 m/s (<=1.7m/s)  AoV Peak P.9 mmHg (<20mmHg)  AoV Mean P.0 mmHg (1.7-11.5mmHg)  AoV VTI:       34.50 cm (18-25cm)  LVOT Diameter: 1.80 cm  (1.8-2.4cm)      RIGHT VENTRICLE:  RV Basal 4.07 cm  RV Mid   2.48 cm  RV Major 6.3 cm  TAPSE:   24.8 mm  RV s'    0.13 m/s    TRICUSPID VALVE/RVSP:  Normal Ranges:  Peak TR Velocity: 3.38 m/s  RV Syst Pressure: 48.7 mmHg (< 30mmHg)  IVC Diam:         1.94 cm    Pulmonary Veins:  PulmV A Revs Luther: 20.90 cm/s  PulmV Cote Luther:   51.40 cm/s  PulmV S/D Luther:    0.90  PulmV Sys Luther:    44.10 cm/s      [id number] Julian Tejada MD  Electronically signed on 2024 at 8:56:12 AM        ** Final **     Cath:  Stress Test:  Stress Results:  No results found for this or any previous visit from the past 365 days.     Cardiac Imaging:    Problem List Items Addressed This Visit       Hypertension    Relevant Medications    aspirin 81 mg EC tablet    lisinopril 20 mg tablet    Other Relevant Orders    Hemoglobin A1C    Hyperlipidemia    Relevant Medications    aspirin 81 mg EC tablet    pravastatin (Pravachol) 40 mg tablet    Other Relevant Orders    Hemoglobin A1C    Palpitations    Relevant Medications    aspirin 81 mg EC tablet    lisinopril 20 mg tablet    Other Relevant Orders     Hemoglobin A1C    MARTE (dyspnea on exertion)    Relevant Medications    aspirin 81 mg EC tablet    lisinopril 20 mg tablet    Other Relevant Orders    Hemoglobin A1C    Heart murmur    Relevant Medications    aspirin 81 mg EC tablet    lisinopril 20 mg tablet    Other Relevant Orders    Hemoglobin A1C    Severe tricuspid regurgitation - Primary    Relevant Medications    DILT- mg 24 hr capsule    sotalol (Betapace) 120 mg tablet     Other Visit Diagnoses         Essential hypertension        Relevant Medications    DILT- mg 24 hr capsule    aspirin 81 mg EC tablet    Other Relevant Orders    Hemoglobin A1C      Longstanding persistent atrial fibrillation (Multi)        Relevant Medications    DILT- mg 24 hr capsule    aspirin 81 mg EC tablet    sotalol (Betapace) 120 mg tablet    Other Relevant Orders    Hemoglobin A1C      Type 2 diabetes, diet controlled        Relevant Orders    Hemoglobin A1C           79-year-old female patient with multiple comorbid condition.  Underlying history of normal ejection fraction moderate to severe tricuspid regurgitation.  Underlying Sharber dyspnea exertion.  1.  Essential hypertension.  Continue current lisinopril as well as the Cardizem.  2.  Palpitation fluttering: Currently on sotalol as well as the Cardizem which is able to control the rate.  3.  Dyspnea exertion: No evidence of CHF at the moment.  No leg edema.  I will order patient repeat echocardiogram to evaluate tricuspid valve.  Also will refer patient for structural valve program.  4.  Mixed hyperlipidemia: Continue current Pravachol 40 mg tablet daily.  5.  Diabetes: Will check hemoglobin A1c on the patient.  Diet controlled diabetes.  Current aspirin daily.    Advised patient to avoid lunch meats, canned soups, pizzas, bread rolls, and sandwiches. Advised patient to limit salt intake 1,500 mg daily. Advised patient to exercise 30 mins/3 times a week including treadmill or aerobic type, Goal to  achieve 65% target HR.    Diet and exercise reviewed with patient..advice to walk about 10,000 steps or about 2 hours during day time. Cut back on salt, sugar and flour.    Pt. care time is spent includes independent review of diagnostic tests, labs, radiographs, EKGs and coordination of care. Assessment, impression and plans are reflected in the note above as well as the orders.    Julian Tejada MD  [facility]  [facility]

## 2025-06-20 NOTE — PROGRESS NOTES
[facility] [place]        Subjective   Chief Complaint   Patient presents with    Follow-up     Follow up      79-year-old female patient with multiple comorbid condition.  History of hypertension, hyperlipidemia history of negative nuclear stress test about 2 years ago.  Patient currently on multiple bronchodilators.  Normal ejection fraction by echocardiogram.  Stable cardiac wise.  No active chest pain tightness.  Patient currently on sotalol as well as aspirin.  Patient echocardiogram done finding moderate TR with mild MR seen.  So far asymptomatic.  Fairly active.  Patient had lab done about 4 months ago in February I reviewed independently BMP.  Creatinine is 1.2 with a GFR is 43.  LFT unremarkable.  WBC unremarkable as well as T-cell normal limit for hemoglobin A1c was not checked.  LDL was 92.    She has a past medical history of A-fib (Multi) (03/04/2023), Acute maxillary sinusitis, unspecified (01/25/2016), Allergic rhinitis, unspecified (12/07/2013), Anemia, unspecified (12/07/2013), Cough, unspecified (12/15/2015), Cough, unspecified (07/23/2014), Cough, unspecified (03/23/2015), MARTE (dyspnea on exertion) (09/05/2023), Dysphonia, Gastro-esophageal reflux disease without esophagitis (12/07/2013), Generalized abdominal pain (07/31/2014), Hyperlipidemia (03/04/2023), Hypertension (03/04/2023), Other cervical disc displacement, unspecified cervical region, Palpitations (03/04/2023), Paroxysmal atrial fibrillation (Multi) (09/05/2023), Personal history of other diseases of the respiratory system (12/03/2014), Personal history of other diseases of the respiratory system (04/10/2014), Personal history of transient ischemic attack (TIA), and cerebral infarction without residual deficits, and Unspecified fracture of shaft of humerus, right arm, initial encounter for closed fracture (04/03/2017).  She has a past surgical history that includes Bladder surgery  (2014);  section, classic (2014); Hysterectomy (2014); and Eye surgery (2014).   No relevant family history has been documented for this patient.  Current Outpatient Medications   Medication Sig Dispense Refill    acetaminophen (Tylenol) 500 mg tablet take 2 tablets every 8 hours for 5 days, after 5 days take 2 tablets every 8 hours only as needed for pain      albuterol (ProAir HFA) 90 mcg/actuation inhaler Inhale 2 puffs every 4 hours if needed for wheezing or shortness of breath. 8.5 g 5    amitriptyline (Elavil) 10 mg tablet Take 1 tablet (10 mg) by mouth once daily at bedtime. 90 tablet 1    calcium carbonate 600 mg calcium (1,500 mg) tablet Take 1 tablet (1,500 mg) by mouth 2 times a day.      cetirizine (ZyrTEC) 10 mg capsule Take 1 capsule (10 mg) by mouth once daily at bedtime.      cholecalciferol (Vitamin D-3) 50 MCG (2000 UT) tablet Take 1 tablet (2,000 Units) by mouth once daily.      ibuprofen 200 mg tablet every 8 hours.      levothyroxine (Synthroid, Levoxyl) 75 mcg tablet Take 1 tablet (75 mcg) by mouth once daily. 90 tablet 1    loperamide (Imodium) 2 mg capsule Take by mouth.      magnesium 250 mg tablet Take by mouth.      nitroglycerin (Nitrostat) 0.4 mg SL tablet Place 1 tablet (0.4 mg) under the tongue. As directed may be repeated every 5 minutes up to a maximum of 3 doses in a 15 minute period.      venlafaxine XR (Effexor-XR) 75 mg 24 hr capsule Take 1 capsule (75 mg) by mouth once daily. 90 capsule 1    vit A,C and E-lutein-minerals (Ocuvite with Lutein) 300 mcg-200 mg-27 mg-2 mg tablet Take 1 tablet by mouth once daily.      VITAMIN B COMPLEX ORAL Take 1 tablet by mouth once daily.      aspirin 81 mg EC tablet Take 1 tablet (81 mg) by mouth every other day. 45 tablet 3    diclofenac sodium (Voltaren Arthritis Pain) 1 % gel Apply 4.5 inches (4 g) topically 4 times a day as needed. (Patient not taking: Reported on 2025)      DILT- mg 24 hr capsule  Take 1 capsule (180 mg) by mouth once daily. 90 capsule 3    lisinopril 20 mg tablet Take 1 tablet (20 mg) by mouth 2 times a day. 180 tablet 3    pravastatin (Pravachol) 40 mg tablet Take 1 tablet (40 mg) by mouth once daily. 90 tablet 3    sotalol (Betapace) 120 mg tablet Take 1 tablet (120 mg) by mouth 2 times a day. 180 tablet 3     No current facility-administered medications for this visit.      reports that she has never smoked. She has been exposed to tobacco smoke. She has never used smokeless tobacco. She reports that she does not currently use alcohol. She reports that she does not currently use drugs.  Allergies:  Bee pollen, Cefdinir, Ciprofloxacin, Iodinated contrast media, and Iodine    ROS: See HPI  CONSTITUTIONAL: Chills- none. Fever- none. Weight change appropriate for age.  HEENT: Headache- Negative.  Change in vision- none.  Ear pain- none. Nasal congestion- none. Post-nasal drip-none.  Sore throat-none.  CARDIOLOGY: Chest pain- none.  Leg edema-trace.  Murmurs-soft systolic.  Palpitation- none.  RESPIRATORY: Denies any shortness of breath.  GI: Abdominal pain- none.  Change in bowel habits- none.  Constipation- none.  Diarrhea- none.  Nausea- none.  Vomiting- none.  MUSCULOSKELETAL: Joint pain- none.  Muscle aches- none.  DERMATOLOGY: Rash- none.  NEUROLOGY: Dizziness- none.   Headache- none.  PSYCHIATRY: Denies any depression or anxiety     Vitals:    06/20/25 1440   BP: 122/56   Pulse: 72   Resp: 16   SpO2: 97%   Weight: 86.6 kg (191 lb)   PainSc: 0-No pain      BMI:Body mass index is 31.78 kg/m².   General Cardiology:  General Appearance: Alert, oriented and in no acute distress.  HEENT: extra ocular movements intact (EOMI), pupils equal,  round, reactive to light and accommodation (PERRLA).  Carotid Upstroke: no bruit, normal.  Jugular Venous Distention (JVD): flat.  Chest: normal.  Lungs: Clear to auscultation,   Heart Sounds: no S3 or S4, normal S1, S2, regular rate.  Murmur, Click,  "Gallop: soft systolic murmur.  Abdomen: no hepatomegaly, no masses felt, soft.  Extremities: no leg edema.  Peripheral pulses: 2 plus bilateral.  NEUROLOGY Cranial nerves II-XII grossly intact.     Last Labs:  CMP:  Recent Labs     02/04/25  1348 09/27/24  1306 11/11/22  1201    138 139   K 4.6 4.7 4.9    104 103   CO2 26 24 27   ANIONGAP 10 15 14   BUN 27* 26* 23   CREATININE 1.27* 1.01 0.91   EGFR 43* 57*  --    GLUCOSE 81 92 83     Recent Labs     02/04/25  1348 11/11/22  1201 08/30/21  1315   ALBUMIN 4.3 4.4 4.3   ALKPHOS 66 74 67   ALT 13 11 8   AST 16 17 13   BILITOT 0.6 0.6 0.5     CBC:  Recent Labs     02/04/25  1348 09/27/24  1306 11/11/22  1201   WBC 5.9 5.8 5.8   HGB 12.3 11.6* 12.1   HCT 38.0 37.2 38.6    216 226   MCV 91.8 97 97     COAG:   Recent Labs     10/22/18  1310   INR 1.1     HEME/ENDO:  Recent Labs     02/04/25  1348 11/11/22  1201 01/15/21  1121 12/26/19  1550 02/27/19  1630 05/15/18  0000   FERRITIN  --   --   --  84  --   --    IRONSAT  --   --   --  35  --   --    TSH 1.86 1.41 1.17 1.67   < > 2.23   HGBA1C  --   --   --   --   --  5.1    < > = values in this interval not displayed.      CARDIAC: No results for input(s): \"LDH\", \"CKMB\", \"TROPHS\", \"BNP\" in the last 66087 hours.    No lab exists for component: \"CK\", \"CKMBP\"  Recent Labs     09/27/24  1306 11/11/22  1201 01/15/21  1121 06/16/20  0949   CHOL 185 188 162 174   LDLF  --  91 78 87   LDLCALC 92  --   --   --    HDL 80.2 85.9 73.0 76.0   TRIG 65 58 55 53       Last Cardiology Tests:  Echo:  Echo Results:  Transthoracic Echo (TTE) Complete 04/03/2024    [address] , [place] [address]  Phone [phone]    TRANSTHORACIC ECHOCARDIOGRAM REPORT      Patient Name:      KIERSTEN Galo Physician:    India Worrell MD  Study Date:        4/3/2024             Ordering Provider:    [id number] WORRELL  MRN/PID:           [id number]             " Fellow:  Accession#:        [id number]         Nurse:  Date of Birth/Age: 1945 / 78      Sonographer:          Rachael Wren RDCS  years  Gender:            F                    Additional Staff:     Ashlee Esqueda  Student  Height:            165.10 cm            Admit Date:           4/3/2024  Weight:            82.10 kg             Admission Status:     Outpatient  BSA / BMI:         1.90 m2 / 30.12      Department Location:  [facility]  kg/m2  Blood Pressure: 116 /61 mmHg    Study Type:    TRANSTHORACIC ECHO (TTE) COMPLETE  Diagnosis/ICD: Essential (primary) hypertension-I10  Indication:    HTN, AFIB  CPT Codes:     Echo Complete w Full Doppler-[id number]    Patient History:  Pertinent History: Previous SVT, HTN, Hyperlipidemia and A-Fib. TIA.    Study Detail: The following Echo studies were performed: 2D, M-Mode, Doppler and  color flow.      PHYSICIAN INTERPRETATION:  Left Ventricle: Left ventricular systolic function is normal, with an estimated ejection fraction of 60%. There are no regional wall motion abnormalities. The left ventricular cavity size is normal. Spectral Doppler shows an impaired relaxation pattern of left ventricular diastolic filling.  Left Atrium: The left atrium is upper limits of normal in size.  Right Ventricle: The right ventricle is normal in size. There is normal right ventricular global systolic function.  Right Atrium: The right atrium is normal in size.  Aortic Valve: The aortic valve is probably trileaflet. There is evidence of mildly elevated transaortic gradients consistent with sclerosis of the aortic valve.  There is trivial aortic valve regurgitation. The peak instantaneous gradient of the aortic valve is 9.9 mmHg. The mean gradient of the aortic valve is 4.0 mmHg.  Mitral Valve: The mitral valve is mildly thickened. There is mild calcification of the posterior mitral valve leaflet. There is mild mitral annular calcification. There is trace to mild mitral valve  regurgitation.  Tricuspid Valve: The tricuspid valve is structurally normal. There is mild to moderate tricuspid regurgitation.  Pulmonic Valve: The pulmonic valve is not well visualized. There is no indication of pulmonic valve regurgitation.  Pericardium: There is no pericardial effusion noted.  Aorta: The aortic root was not well visualized.      CONCLUSIONS:  1. Left ventricular systolic function is normal with a 60% estimated ejection fraction.  2. Spectral Doppler shows an impaired relaxation pattern of left ventricular diastolic filling.  3. Mild to moderate tricuspid regurgitation.  4. Aortic valve sclerosis.    QUANTITATIVE DATA SUMMARY:  2D MEASUREMENTS:  Normal Ranges:  LAs:           4.00 cm   (2.7-4.0cm)  IVSd:          1.30 cm   (0.6-1.1cm)  LVPWd:         1.32 cm   (0.6-1.1cm)  LVIDd:         3.91 cm   (3.9-5.9cm)  LVIDs:         2.27 cm  LV Mass Index: 96.3 g/m2  LV % FS        41.9 %    LA VOLUME:  Normal Ranges:  LA Vol A4C:        68.1 ml   (22+/-6mL/m2)  LA Vol Index A4C:  35.9ml/m2  LA Area A4C:       21.6 cm2  LA Major Axis A4C: 5.8 cm  LA Vol A4C:        64.1 ml    RA VOLUME BY A/L METHOD:  Normal Ranges:  RA Vol A4C:        40.0 ml    (8.3-19.5ml)  RA Vol Index A4C:  21.1 ml/m2  RA Area A4C:       15.1 cm2  RA Major Axis A4C: 4.8 cm    M-MODE MEASUREMENTS:  Normal Ranges:  Ao Root: 2.60 cm (2.0-3.7cm)  LAs:     3.70 cm (2.7-4.0cm)    LV SYSTOLIC FUNCTION BY 2D PLANIMETRY (MOD):  Normal Ranges:  EF-A4C View: 60.2 % (>=55%)    LV DIASTOLIC FUNCTION:  Normal Ranges:  MV Peak E:        1.14 m/s   (0.7-1.2 m/s)  MV Peak A:        1.03 m/s   (0.42-0.7 m/s)  E/A Ratio:        1.11       (1.0-2.2)  MV lateral e'     0.06 m/s  MV medial e'      0.04 m/s  PulmV Sys Luther:    44.10 cm/s  PulmV Cote Luther:   51.40 cm/s  PulmV S/D Luther:    0.90  PulmV A Revs Luther: 20.90 cm/s    MITRAL VALVE:  Normal Ranges:  MV Vmax:    1.53 m/s (<=1.3m/s)  MV peak P.4 mmHg (<5mmHg)  MV mean PG: 3.0 mmHg (<48mmHg)  MV DT:       409 msec (150-240msec)    MITRAL INSUFFICIENCY:  Normal Ranges:  MR VTI:  212.00 cm  MR Vmax: 665.00 cm/s    AORTIC VALVE:  Normal Ranges:  AoV Vmax:      1.57 m/s (<=1.7m/s)  AoV Peak P.9 mmHg (<20mmHg)  AoV Mean P.0 mmHg (1.7-11.5mmHg)  AoV VTI:       34.50 cm (18-25cm)  LVOT Diameter: 1.80 cm  (1.8-2.4cm)      RIGHT VENTRICLE:  RV Basal 4.07 cm  RV Mid   2.48 cm  RV Major 6.3 cm  TAPSE:   24.8 mm  RV s'    0.13 m/s    TRICUSPID VALVE/RVSP:  Normal Ranges:  Peak TR Velocity: 3.38 m/s  RV Syst Pressure: 48.7 mmHg (< 30mmHg)  IVC Diam:         1.94 cm    Pulmonary Veins:  PulmV A Revs Luther: 20.90 cm/s  PulmV Cote Luther:   51.40 cm/s  PulmV S/D Luther:    0.90  PulmV Sys Luther:    44.10 cm/s      [id number] Julian Tejada MD  Electronically signed on 2024 at 8:56:12 AM        ** Final **     Cath:  Stress Test:  Stress Results:  No results found for this or any previous visit from the past 365 days.     Cardiac Imaging:    Problem List Items Addressed This Visit       Hypertension    Relevant Medications    aspirin 81 mg EC tablet    lisinopril 20 mg tablet    Other Relevant Orders    Hemoglobin A1C    Hyperlipidemia    Relevant Medications    aspirin 81 mg EC tablet    pravastatin (Pravachol) 40 mg tablet    Other Relevant Orders    Hemoglobin A1C    Palpitations    Relevant Medications    aspirin 81 mg EC tablet    lisinopril 20 mg tablet    Other Relevant Orders    Hemoglobin A1C    MARTE (dyspnea on exertion)    Relevant Medications    aspirin 81 mg EC tablet    lisinopril 20 mg tablet    Other Relevant Orders    Hemoglobin A1C    Heart murmur    Relevant Medications    aspirin 81 mg EC tablet    lisinopril 20 mg tablet    Other Relevant Orders    Hemoglobin A1C    Severe tricuspid regurgitation - Primary    Relevant Medications    DILT- mg 24 hr capsule    sotalol (Betapace) 120 mg tablet     Other Visit Diagnoses         Essential hypertension        Relevant Medications    DILT- mg 24 hr capsule     aspirin 81 mg EC tablet    Other Relevant Orders    Hemoglobin A1C      Longstanding persistent atrial fibrillation (Multi)        Relevant Medications    DILT- mg 24 hr capsule    aspirin 81 mg EC tablet    sotalol (Betapace) 120 mg tablet    Other Relevant Orders    Hemoglobin A1C      Type 2 diabetes, diet controlled        Relevant Orders    Hemoglobin A1C           79-year-old female patient with multiple comorbid condition.  Underlying history of normal ejection fraction moderate to severe tricuspid regurgitation.  Underlying Sharber dyspnea exertion.  1.  Essential hypertension.  Continue current lisinopril as well as the Cardizem.  2.  Palpitation fluttering: Currently on sotalol as well as the Cardizem which is able to control the rate.  3.  Dyspnea exertion: No evidence of CHF at the moment.  No leg edema.  I will order patient repeat echocardiogram to evaluate tricuspid valve.  Also will refer patient for structural valve program.  4.  Mixed hyperlipidemia: Continue current Pravachol 40 mg tablet daily.  5.  Diabetes: Will check hemoglobin A1c on the patient.  Diet controlled diabetes.  Current aspirin daily.    Advised patient to avoid lunch meats, canned soups, pizzas, bread rolls, and sandwiches. Advised patient to limit salt intake 1,500 mg daily. Advised patient to exercise 30 mins/3 times a week including treadmill or aerobic type, Goal to achieve 65% target HR.    Diet and exercise reviewed with patient..advice to walk about 10,000 steps or about 2 hours during day time. Cut back on salt, sugar and flour.    Pt. care time is spent includes independent review of diagnostic tests, labs, radiographs, EKGs and coordination of care. Assessment, impression and plans are reflected in the note above as well as the orders.    Julian Tejada MD  [facility]

## 2025-06-24 LAB
EST. AVERAGE GLUCOSE BLD GHB EST-MCNC: 100 MG/DL
EST. AVERAGE GLUCOSE BLD GHB EST-SCNC: 5.5 MMOL/L
HBA1C MFR BLD: 5.1 %

## 2025-06-25 ENCOUNTER — HOSPITAL ENCOUNTER (OUTPATIENT)
Dept: CARDIOLOGY | Facility: HOSPITAL | Age: 80
Discharge: HOME | End: 2025-06-25
Payer: MEDICARE

## 2025-06-25 DIAGNOSIS — E11.9 TYPE 2 DIABETES, DIET CONTROLLED: ICD-10-CM

## 2025-06-25 DIAGNOSIS — I48.11 LONGSTANDING PERSISTENT ATRIAL FIBRILLATION (MULTI): ICD-10-CM

## 2025-06-25 DIAGNOSIS — I07.1 SEVERE TRICUSPID REGURGITATION: ICD-10-CM

## 2025-06-25 DIAGNOSIS — I10 ESSENTIAL HYPERTENSION: ICD-10-CM

## 2025-06-25 DIAGNOSIS — I10 PRIMARY HYPERTENSION: ICD-10-CM

## 2025-06-25 DIAGNOSIS — R01.1 HEART MURMUR: ICD-10-CM

## 2025-06-25 DIAGNOSIS — R00.2 PALPITATIONS: ICD-10-CM

## 2025-06-25 DIAGNOSIS — E78.5 HYPERLIPIDEMIA, UNSPECIFIED HYPERLIPIDEMIA TYPE: ICD-10-CM

## 2025-06-25 DIAGNOSIS — R06.09 DOE (DYSPNEA ON EXERTION): ICD-10-CM

## 2025-06-25 LAB
AORTIC VALVE MEAN GRADIENT: 26 MMHG
AORTIC VALVE PEAK VELOCITY: 3.6 M/S
AV PEAK GRADIENT: 52 MMHG
EJECTION FRACTION APICAL 4 CHAMBER: 61.2
EJECTION FRACTION: 63 %
LEFT ATRIUM VOLUME AREA LENGTH INDEX BSA: 34.1 ML/M2
LEFT VENTRICLE INTERNAL DIMENSION DIASTOLE: 4.26 CM (ref 3.5–6)
LEFT VENTRICULAR OUTFLOW TRACT DIAMETER: 2.2 CM
LV EJECTION FRACTION BIPLANE: 61 %
MITRAL VALVE E/A RATIO: 1.24
RIGHT VENTRICLE FREE WALL PEAK S': 15.8 CM/S
RIGHT VENTRICLE PEAK SYSTOLIC PRESSURE: 41 MMHG
TRICUSPID ANNULAR PLANE SYSTOLIC EXCURSION: 2.5 CM

## 2025-06-25 PROCEDURE — 93306 TTE W/DOPPLER COMPLETE: CPT

## 2025-06-25 PROCEDURE — 93306 TTE W/DOPPLER COMPLETE: CPT | Performed by: INTERNAL MEDICINE

## 2025-07-03 DIAGNOSIS — I07.1 SEVERE TRICUSPID REGURGITATION: Primary | ICD-10-CM

## 2025-07-14 DIAGNOSIS — I10 ESSENTIAL HYPERTENSION: ICD-10-CM

## 2025-07-14 DIAGNOSIS — I07.1 SEVERE TRICUSPID REGURGITATION: Primary | ICD-10-CM

## 2025-07-14 DIAGNOSIS — E11.9 TYPE 2 DIABETES, DIET CONTROLLED: ICD-10-CM

## 2025-07-15 DIAGNOSIS — Z91.041 ALLERGY TO INTRAVENOUS CONTRAST MEDIA: Primary | ICD-10-CM

## 2025-07-15 RX ORDER — PREDNISONE 50 MG/1
50 TABLET ORAL SEE ADMIN INSTRUCTIONS
Qty: 3 TABLET | Refills: 2 | Status: SHIPPED | OUTPATIENT
Start: 2025-07-15

## 2025-07-15 RX ORDER — DIPHENHYDRAMINE HCL 50 MG
50 CAPSULE ORAL SEE ADMIN INSTRUCTIONS
Qty: 1 CAPSULE | Refills: 2 | Status: SHIPPED | OUTPATIENT
Start: 2025-07-15

## 2025-07-16 NOTE — PROGRESS NOTES
VALVE AND STRUCTURAL HEART DISEASE       Referred by Dr. Julian Tejada     History Of Present Illness:    Natividad Tena is a 79 y.o. female presenting with ***.      NYHA   Frailty   STS PROM   EKG  TTE 2025 - LVEF 60%, late peaking aortic veloicty doppler envelop 5m/s, ? Subaortic membrane, mild MAC MG 4mmHg Normal RV size and systolic function, Tricuspid [place] 16cm/s, TAPSE 2.53cm, mild TR  HELIO - pending  Cath- pending  CTA TAVR - pending  CMR - pending  K[facility]Q   Dental   Bleeding risk  CIED  PFT    PAST MEDICAL HISTORY  Atrial fibrillation  HFpEF  ALLEGRA on CPAP  Nose bleds    Past Medical History:  She has a past medical history of A-fib (Multi) (2023), Acute maxillary sinusitis, unspecified (2016), Allergic rhinitis, unspecified (2013), Anemia, unspecified (2013), Cough, unspecified (12/15/2015), Cough, unspecified (2014), Cough, unspecified (2015), MARTE (dyspnea on exertion) (2023), Dysphonia, Gastro-esophageal reflux disease without esophagitis (2013), Generalized abdominal pain (2014), Hyperlipidemia (2023), Hypertension (2023), Other cervical disc displacement, unspecified cervical region, Palpitations (2023), Paroxysmal atrial fibrillation (Multi) (2023), Personal history of other diseases of the respiratory system (2014), Personal history of other diseases of the respiratory system (04/10/2014), Personal history of transient ischemic attack (TIA), and 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    Past Surgical History:  She has a past surgical history that includes Bladder surgery (2014);  section, classic (2014); Hysterectomy (2014); and Eye surgery (2014).      Social History:  She reports that she has never smoked. She has been exposed to tobacco smoke. She has never used  smokeless tobacco. She reports that she does not currently use alcohol. She reports that she does not currently use drugs.    Family History:  Family History[1]     Allergies:  Bee pollen, Cefdinir, Ciprofloxacin, Iodinated contrast media, and Iodine    Outpatient Medications:  Current Outpatient Medications   Medication Instructions    acetaminophen (Tylenol) 500 mg tablet take 2 tablets every 8 hours for 5 days, after 5 days take 2 tablets every 8 hours only as needed for pain    albuterol (ProAir HFA) 90 mcg/actuation inhaler 2 puffs, inhalation, Every 4 hours PRN    amitriptyline (ELAVIL) 10 mg, oral, Nightly    aspirin 81 mg, oral, Every other day    calcium carbonate 600 mg calcium (1,500 mg) tablet 1 tablet, 2 times daily    cetirizine (ZyrTEC) 10 mg capsule 1 capsule, Nightly    cholecalciferol (VITAMIN D-3) 2,000 Units, Daily    diclofenac sodium (VOLTAREN ARTHRITIS PAIN) 4 g, 4 times daily PRN    DILT- mg, oral, Daily    diphenhydrAMINE (BENADRYL) 50 mg, oral, See admin instructions, Take 1 cap PO 1 hour prior to the administration of IV contrast    ibuprofen 200 mg tablet Every 8 hours    levothyroxine (SYNTHROID, LEVOXYL) 75 mcg, oral, Daily    lisinopril 20 mg, oral, 2 times daily    loperamide (Imodium) 2 mg capsule Take by mouth.    magnesium 250 mg tablet Take by mouth.    nitroglycerin (NITROSTAT) 0.4 mg    pravastatin (PRAVACHOL) 40 mg, oral, Daily    predniSONE (DELTASONE) 50 mg, oral, See admin instructions, Take 1 tablet PO 13 hours prior, 7 hours prior, and 1 hour prior the administration of IV contrast    sotalol (BETAPACE) 120 mg, oral, 2 times daily    venlafaxine XR (EFFEXOR-XR) 75 mg, oral, Daily    vit A,C and E-lutein-minerals (Ocuvite with Lutein) 300 mcg-200 mg-27 mg-2 mg tablet 1 tablet, Daily    VITAMIN B COMPLEX ORAL 1 tablet, Daily        Last Recorded Vitals:  There were no vitals filed for this visit.    Physical Exam:  ***  @PESEC->PESEC(CIRCULAR REFERENCE)@       Last  Labs:  CBC -  Lab Results   Component Value Date    WBC 5.9 02/04/2025    HGB 12.3 02/04/2025    HCT 38.0 02/04/2025    MCV 91.8 02/04/2025     02/04/2025       CMP -  Lab Results   Component Value Date    CALCIUM 9.4 02/04/2025    PROT 6.4 02/04/2025    ALBUMIN 4.3 02/04/2025    AST 16 02/04/2025    ALT 13 02/04/2025    ALKPHOS 66 02/04/2025    BILITOT 0.6 02/04/2025       LIPID PANEL -   Lab Results   Component Value Date    CHOL 185 09/27/2024    TRIG 65 09/27/2024    HDL 80.2 09/27/2024    CHHDL 2.3 09/27/2024    LDLF 91 11/11/2022    VLDL 13 09/27/2024    NHDL 105 09/27/2024       RENAL FUNCTION PANEL -   Lab Results   Component Value Date    GLUCOSE 81 02/04/2025     02/04/2025    K 4.6 02/04/2025     02/04/2025    CO2 26 02/04/2025    ANIONGAP 10 02/04/2025    BUN 27 (H) 02/04/2025    CREATININE 1.27 (H) 02/04/2025    CALCIUM 9.4 02/04/2025    ALBUMIN 4.3 02/04/2025        Lab Results   Component Value Date    HGBA1C 5.1 06/23/2025       ASSESSMENT AND PLAN    LVOTO  TR?    HELIO  RLHC, invasive Hemodynamics for LVOTO  CMR  CTA         Christiana Monroe MD       [1]   Family History  Problem Relation Name Age of Onset    Hypertension Mother      Cancer Mother      Cancer Father      Heart attack Father      Hypertension Brother

## 2025-07-16 NOTE — PROGRESS NOTES
[facility]    Referred by Julian Skinner for Valve and Structural Heart Evaluation      Chief Complaint:  Cardiac disease assessment    HPI:  Ms. Natividad Tena is an 79 y.o. female, who is a patient of Dr.Charita KAYLEE Brown DO.  I have been asked to see her by Julian Skinner to evaluate Tricuspid Valve Regurgitation/Aortic Stenosis???    Reviewed today as part of the multidisciplinary team with Dr. Monroe.    Natividad Tena has a PMHx significant for hypertension, hyperlipidemia, and a-fib.  She's presented to cardiology clinic with dyspnea on exertion.  TTE performed on 6/25/25      Medical History[1]      Pertinent Diagnostics:  TTE (6/25/25)  1. Poorly visualized anatomical structures due to suboptimal image quality.   2. The left ventricular systolic function is normal with a visually estimated ejection fraction of 60-65%.   3. There is normal right ventricular global systolic function.   4. There is moderate mitral annular calcification.   5. Trace mitral valve regurgitation.   6. Mild tricuspid regurgitation is visualized.   7. The Doppler estimated RVSP is mildly elevated at 41 mmHg.   8. Aortic valve is not well-visualized. There is severe gradient across aortic valve. Unclear if it is subaortic membrane or severe aortic valve stenosis. Also the gradient could be secondary to contamination from mitral valve regurgitation. Recommend transesophageal echocardiogram to assess aortic valve structure.   9. There is moderately increased posterior left ventricular wall thickness      Cardiac Catheterization (pending)      CT TAVR (8/1/25)      Dental (pending)    Impression/Plan:  Ms. Natividad Tena is an 79 y.o. female who has been referred with Aortic Stenosis      I had a chance to review all available, pertinent investigations.      ____________________________________________________________  Iraj Marcelo MD, DPhil, [facility]  Clinical Professor of Surgery  Chief,  Division of Cardiac Surgery  Director, Cardiothoracic Transplant     [facility]        [1]   Past Medical History:  Diagnosis Date    A-fib (Multi) 03/04/2023    Acute maxillary sinusitis, unspecified 01/25/2016    Acute maxillary sinusitis    Allergic rhinitis, unspecified 12/07/2013    Allergic rhinitis    Anemia, unspecified 12/07/2013    Anemia    Cough, unspecified 12/15/2015    Cough    Cough, unspecified 07/23/2014    Cough    Cough, unspecified 03/23/2015    Cough    MARTE (dyspnea on exertion) 09/05/2023    Dysphonia     Dysphonia    Gastro-esophageal reflux disease without esophagitis 12/07/2013    Esophageal reflux    Generalized abdominal pain 07/31/2014    Generalized abdominal pain    Hyperlipidemia 03/04/2023    Hypertension 03/04/2023    Other cervical disc displacement, unspecified cervical region     Herniated disc, cervical    Palpitations 03/04/2023    Paroxysmal atrial fibrillation (Multi) 09/05/2023    Personal history of other diseases of the respiratory system 12/03/2014    History of allergic rhinitis    Personal history of other diseases of the respiratory system 04/10/2014    History of allergic rhinitis    Personal history of transient ischemic attack (TIA), and cerebral infarction without residual deficits     History of transient cerebral ischemia    Unspecified fracture of shaft of humerus, right arm, initial encounter for closed fracture 04/03/2017    Fracture of humerus, right, closed

## 2025-07-22 ENCOUNTER — OFFICE VISIT (OUTPATIENT)
Dept: PRIMARY CARE | Facility: CLINIC | Age: 80
End: 2025-07-22
Payer: MEDICARE

## 2025-07-22 VITALS
DIASTOLIC BLOOD PRESSURE: 68 MMHG | SYSTOLIC BLOOD PRESSURE: 105 MMHG | HEART RATE: 71 BPM | BODY MASS INDEX: 31.82 KG/M2 | OXYGEN SATURATION: 95 % | HEIGHT: 65 IN | WEIGHT: 191 LBS
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
## 2025-07-22 DIAGNOSIS — G47.00 INSOMNIA, UNSPECIFIED TYPE: ICD-10-CM

## 2025-07-22 PROCEDURE — 1158F ADVNC CARE PLAN TLK DOCD: CPT | Performed by: STUDENT IN AN ORGANIZED HEALTH CARE EDUCATION/TRAINING PROGRAM

## 2025-07-22 PROCEDURE — 1125F AMNT PAIN NOTED PAIN PRSNT: CPT | Performed by: STUDENT IN AN ORGANIZED HEALTH CARE EDUCATION/TRAINING PROGRAM

## 2025-07-22 PROCEDURE — 3078F DIAST BP <80 MM HG: CPT | Performed by: STUDENT IN AN ORGANIZED HEALTH CARE EDUCATION/TRAINING PROGRAM

## 2025-07-22 PROCEDURE — 1036F TOBACCO NON-USER: CPT | Performed by: STUDENT IN AN ORGANIZED HEALTH CARE EDUCATION/TRAINING PROGRAM

## 2025-07-22 PROCEDURE — 3074F SYST BP LT 130 MM HG: CPT | Performed by: STUDENT IN AN ORGANIZED HEALTH CARE EDUCATION/TRAINING PROGRAM

## 2025-07-22 PROCEDURE — 99214 OFFICE O/P EST MOD 30 MIN: CPT | Performed by: STUDENT IN AN ORGANIZED HEALTH CARE EDUCATION/TRAINING PROGRAM

## 2025-07-22 RX ORDER — AMITRIPTYLINE HYDROCHLORIDE 10 MG/1
10 TABLET, FILM COATED ORAL NIGHTLY
Qty: 90 TABLET | Refills: 1 | Status: SHIPPED | OUTPATIENT
Start: 2025-07-22 | End: 2026-07-22

## 2025-07-22 RX ORDER — METHYLPREDNISOLONE 4 MG/1
TABLET ORAL
Qty: 21 TABLET | Refills: 0 | Status: SHIPPED | OUTPATIENT
Start: 2025-07-22 | End: 2025-07-24

## 2025-07-22 RX ORDER — LIDOCAINE HYDROCHLORIDE 20 MG/ML
SOLUTION ORAL; TOPICAL
COMMUNITY
Start: 2025-02-04

## 2025-07-22 ASSESSMENT — PAIN SCALES - GENERAL: PAINLEVEL_OUTOF10: 3

## 2025-07-22 NOTE — PROGRESS NOTES
"  Subjective   Patient ID:   Natividad Tena is a  79 y.o. female who presents for Fall (Patient is present in office for mid/lower back/sciatica pain following a fall 2 days ago- tripped over a tree root on buttocks then on back - no radiating pain - tried extra strength Tylenol and lidocaine lotion ).     HPI   Sunday, pt was doing some landscaping but tripped over a tree root   Doing some ice, lidocaine cream, tylenol   Had 9/10 pain, poor sleep   Now down to a 3-4/10      Current Medications[1]    Visit Vitals  /68   Pulse 71   Ht 1.651 m (5' 5\")   Wt 86.6 kg (191 lb)   LMP  (LMP Unknown)   SpO2 95%   BMI 31.78 kg/m²   OB Status Postmenopausal   Smoking Status Never   BSA 1.99 m²       Objective   Physical Exam  Vitals reviewed.   Constitutional:       Appearance: Normal appearance.     Cardiovascular:      Rate and Rhythm: Normal rate and regular rhythm.      Heart sounds: No murmur heard.  Pulmonary:      Effort: Pulmonary effort is normal. No respiratory distress.      Breath sounds: Normal breath sounds. No wheezing.     Musculoskeletal:        Arms:       Cervical back: Neck supple.      Left lower leg: No edema.      Comments: Pain with palpation of the L3-5 paraspinal muscles with palpation.      Neurological:      Mental Status: She is alert.           Assessment/Plan   Diagnoses and all orders for this visit:  Fall, initial encounter  -     XR lumbar spine 2-3 views; Future  -     methylPREDNISolone (Medrol Dospak) 4 mg tablets; Take as directed on package.  Insomnia, unspecified type  -     amitriptyline (Elavil) 10 mg tablet; Take 1 tablet (10 mg) by mouth once daily at bedtime.  Fall  Likely lumbar strain  Continue tylenol, lidocaine and stretches   Medrol dosepak ordered     Insomnia   Sleep has improved.  Refilled amitriptyline        HTN/palpitations   Following with cardiology   Controlled with diltiazem and sotalol  Stress test 2/23 wnl      Hypothyroidism   Levothyroxine refilled   "   Anxiety/depression  Stable   Continue effexor 75mg daily      ALLEGRA   Has been sleeping in her chair since her clavical fracture   Normally compliant with CPAP machine   Auto pap 5-15 with supplies from [facility]   BMI 32.32kg/m2      Pt declines MWV   Virginia Brown, DO 03/12/25 2:02 PM          [1]   Current Outpatient Medications:     acetaminophen (Tylenol) 500 mg tablet, take 2 tablets every 8 hours for 5 days, after 5 days take 2 tablets every 8 hours only as needed for pain, Disp: , Rfl:     albuterol (ProAir HFA) 90 mcg/actuation inhaler, Inhale 2 puffs every 4 hours if needed for wheezing or shortness of breath., Disp: 8.5 g, Rfl: 5    amitriptyline (Elavil) 10 mg tablet, Take 1 tablet (10 mg) by mouth once daily at bedtime., Disp: 90 tablet, Rfl: 1    aspirin 81 mg EC tablet, Take 1 tablet (81 mg) by mouth every other day., Disp: 45 tablet, Rfl: 3    calcium carbonate 600 mg calcium (1,500 mg) tablet, Take 1 tablet (1,500 mg) by mouth 2 times a day., Disp: , Rfl:     cetirizine (ZyrTEC) 10 mg capsule, Take 1 capsule (10 mg) by mouth once daily at bedtime., Disp: , Rfl:     cholecalciferol (Vitamin D-3) 50 MCG (2000 UT) tablet, Take 1 tablet (2,000 Units) by mouth once daily., Disp: , Rfl:     DILT- mg 24 hr capsule, Take 1 capsule (180 mg) by mouth once daily., Disp: 90 capsule, Rfl: 3    diphenhydrAMINE (BENADryl) 50 mg capsule, Take 1 capsule (50 mg) by mouth see administration instructions for 1 dose. Take 1 cap PO 1 hour prior to the administration of IV contrast, Disp: 1 capsule, Rfl: 2    ibuprofen 200 mg tablet, every 8 hours., Disp: , Rfl:     levothyroxine (Synthroid, Levoxyl) 75 mcg tablet, Take 1 tablet (75 mcg) by mouth once daily., Disp: 90 tablet, Rfl: 1    Lidocaine Viscous 2 % solution, , Disp: , Rfl:     lisinopril 20 mg tablet, Take 1 tablet (20 mg) by mouth 2 times a day., Disp: 180 tablet, Rfl: 3    loperamide (Imodium) 2 mg capsule, Take by mouth., Disp: , Rfl:     magnesium 250 mg  tablet, Take by mouth., Disp: , Rfl:     nitroglycerin (Nitrostat) 0.4 mg SL tablet, Place 1 tablet (0.4 mg) under the tongue. As directed may be repeated every 5 minutes up to a maximum of 3 doses in a 15 minute period., Disp: , Rfl:     pravastatin (Pravachol) 40 mg tablet, Take 1 tablet (40 mg) by mouth once daily., Disp: 90 tablet, Rfl: 3    sotalol (Betapace) 120 mg tablet, Take 1 tablet (120 mg) by mouth 2 times a day., Disp: 180 tablet, Rfl: 3    venlafaxine XR (Effexor-XR) 75 mg 24 hr capsule, Take 1 capsule (75 mg) by mouth once daily., Disp: 90 capsule, Rfl: 1    vit A,C and E-lutein-minerals (Ocuvite with Lutein) 300 mcg-200 mg-27 mg-2 mg tablet, Take 1 tablet by mouth once daily., Disp: , Rfl:     VITAMIN B COMPLEX ORAL, Take 1 tablet by mouth once daily., Disp: , Rfl:     diclofenac sodium (Voltaren Arthritis Pain) 1 % gel, Apply 4.5 inches (4 g) topically 4 times a day as needed. (Patient not taking: Reported on 7/22/2025), Disp: , Rfl:     predniSONE (Deltasone) 50 mg tablet, Take 1 tablet (50 mg) by mouth see administration instructions. Take 1 tablet PO 13 hours prior, 7 hours prior, and 1 hour prior the administration of IV contrast (Patient not taking: Reported on 7/22/2025), Disp: 3 tablet, Rfl: 2
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
## 2025-07-24 DIAGNOSIS — E03.9 HYPOTHYROIDISM, UNSPECIFIED TYPE: ICD-10-CM

## 2025-07-24 RX ORDER — METHYLPREDNISOLONE 4 MG/1
TABLET ORAL
Qty: 21 TABLET | Refills: 0 | Status: SHIPPED | OUTPATIENT
Start: 2025-07-24

## 2025-07-28 ENCOUNTER — HOSPITAL ENCOUNTER (OUTPATIENT)
Dept: RADIOLOGY | Facility: HOSPITAL | Age: 80
Discharge: HOME | End: 2025-07-28
Payer: MEDICARE
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
## 2025-07-28 PROCEDURE — 72100 X-RAY EXAM L-S SPINE 2/3 VWS: CPT

## 2025-07-28 PROCEDURE — 72100 X-RAY EXAM L-S SPINE 2/3 VWS: CPT | Performed by: RADIOLOGY

## 2025-07-28 RX ORDER — LEVOTHYROXINE SODIUM 75 UG/1
75 TABLET ORAL DAILY
Qty: 90 TABLET | Refills: 0 | Status: SHIPPED | OUTPATIENT
Start: 2025-07-28

## 2025-07-28 RX ORDER — METHOCARBAMOL 500 MG/1
500 TABLET, FILM COATED ORAL 3 TIMES DAILY
Qty: 40 TABLET | Refills: 0 | Status: SHIPPED | OUTPATIENT
Start: 2025-07-28 | End: 2025-07-28

## 2025-07-28 RX ORDER — TIZANIDINE 2 MG/1
2 TABLET ORAL EVERY 6 HOURS PRN
Qty: 30 TABLET | Refills: 0 | Status: SHIPPED | OUTPATIENT
Start: 2025-07-28 | End: 2025-08-07

## 2025-07-28 NOTE — TELEPHONE ENCOUNTER
Pt called saying she left you a message on Thursday, hasn't heard back. She is still in a lot of pain. Do you want her to have another appt with you or just get the x-ray you ordered?

## 2025-07-30 ENCOUNTER — HOSPITAL ENCOUNTER (OUTPATIENT)
Dept: RADIOLOGY | Facility: CLINIC | Age: 80
Discharge: HOME | End: 2025-07-30
Payer: MEDICARE

## 2025-07-30 ENCOUNTER — OFFICE VISIT (OUTPATIENT)
Dept: ORTHOPEDIC SURGERY | Facility: CLINIC | Age: 80
End: 2025-07-30
Payer: MEDICARE

## 2025-07-30 VITALS — HEIGHT: 65 IN | BODY MASS INDEX: 31.82 KG/M2 | WEIGHT: 191 LBS

## 2025-07-30 DIAGNOSIS — S32.000A LUMBAR COMPRESSION FRACTURE, CLOSED, INITIAL ENCOUNTER (MULTI): Primary | ICD-10-CM

## 2025-07-30 DIAGNOSIS — S32.000A LUMBAR COMPRESSION FRACTURE, CLOSED, INITIAL ENCOUNTER (MULTI): ICD-10-CM
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
## 2025-07-30 PROCEDURE — 99204 OFFICE O/P NEW MOD 45 MIN: CPT | Performed by: STUDENT IN AN ORGANIZED HEALTH CARE EDUCATION/TRAINING PROGRAM

## 2025-07-30 PROCEDURE — 72100 X-RAY EXAM L-S SPINE 2/3 VWS: CPT

## 2025-07-30 PROCEDURE — G2211 COMPLEX E/M VISIT ADD ON: HCPCS | Performed by: STUDENT IN AN ORGANIZED HEALTH CARE EDUCATION/TRAINING PROGRAM

## 2025-07-30 PROCEDURE — L0641 LO RIG POS PNL L1-L5 PRE OTS: HCPCS | Performed by: STUDENT IN AN ORGANIZED HEALTH CARE EDUCATION/TRAINING PROGRAM

## 2025-07-30 PROCEDURE — 1125F AMNT PAIN NOTED PAIN PRSNT: CPT | Performed by: STUDENT IN AN ORGANIZED HEALTH CARE EDUCATION/TRAINING PROGRAM

## 2025-07-30 PROCEDURE — 72100 X-RAY EXAM L-S SPINE 2/3 VWS: CPT | Performed by: RADIOLOGY

## 2025-07-30 PROCEDURE — 1036F TOBACCO NON-USER: CPT | Performed by: STUDENT IN AN ORGANIZED HEALTH CARE EDUCATION/TRAINING PROGRAM

## 2025-07-30 PROCEDURE — 1159F MED LIST DOCD IN RCRD: CPT | Performed by: STUDENT IN AN ORGANIZED HEALTH CARE EDUCATION/TRAINING PROGRAM

## 2025-07-30 RX ORDER — TIZANIDINE 2 MG/1
2 TABLET ORAL EVERY 6 HOURS PRN
Qty: 30 TABLET | Refills: 0 | Status: CANCELLED | OUTPATIENT
Start: 2025-07-30 | End: 2025-08-09

## 2025-07-30 ASSESSMENT — PAIN DESCRIPTION - DESCRIPTORS: DESCRIPTORS: DISCOMFORT;PRESSURE

## 2025-07-30 ASSESSMENT — PAIN - FUNCTIONAL ASSESSMENT: PAIN_FUNCTIONAL_ASSESSMENT: 0-10

## 2025-07-30 ASSESSMENT — PAIN SCALES - GENERAL: PAINLEVEL_OUTOF10: 4

## 2025-08-01 ENCOUNTER — HOSPITAL ENCOUNTER (OUTPATIENT)
Dept: RADIOLOGY | Facility: HOSPITAL | Age: 80
End: 2025-08-01
Payer: MEDICARE

## 2025-08-01 ENCOUNTER — APPOINTMENT (OUTPATIENT)
Dept: CARDIOLOGY | Facility: HOSPITAL | Age: 80
End: 2025-08-01
Payer: MEDICARE

## 2025-08-01 ENCOUNTER — APPOINTMENT (OUTPATIENT)
Dept: CARDIAC SURGERY | Facility: HOSPITAL | Age: 80
End: 2025-08-01
Payer: MEDICARE

## 2025-08-07 ENCOUNTER — HOSPITAL ENCOUNTER (EMERGENCY)
Facility: HOSPITAL | Age: 80
Discharge: HOME | End: 2025-08-07
Payer: MEDICARE

## 2025-08-07 ENCOUNTER — APPOINTMENT (OUTPATIENT)
Dept: RADIOLOGY | Facility: HOSPITAL | Age: 80
End: 2025-08-07
Payer: MEDICARE

## 2025-08-07 VITALS
BODY MASS INDEX: 31.16 KG/M2 | RESPIRATION RATE: 15 BRPM | TEMPERATURE: 97 F | DIASTOLIC BLOOD PRESSURE: 61 MMHG | OXYGEN SATURATION: 97 % | SYSTOLIC BLOOD PRESSURE: 121 MMHG | HEART RATE: 58 BPM | WEIGHT: 187 LBS | HEIGHT: 65 IN

## 2025-08-07 DIAGNOSIS — M54.50 ACUTE RIGHT-SIDED LOW BACK PAIN WITHOUT SCIATICA: Primary | ICD-10-CM

## 2025-08-07 LAB
ALBUMIN SERPL BCP-MCNC: 3.7 G/DL (ref 3.4–5)
ALP SERPL-CCNC: 129 U/L (ref 33–136)
ALT SERPL W P-5'-P-CCNC: 13 U/L (ref 7–45)
ANION GAP SERPL CALCULATED.3IONS-SCNC: 12 MMOL/L (ref 10–20)
APPEARANCE UR: ABNORMAL
AST SERPL W P-5'-P-CCNC: 15 U/L (ref 9–39)
BACTERIA #/AREA URNS AUTO: ABNORMAL /HPF
BASOPHILS # BLD AUTO: 0.05 X10*3/UL (ref 0–0.1)
BASOPHILS NFR BLD AUTO: 0.6 %
BILIRUB SERPL-MCNC: 0.5 MG/DL (ref 0–1.2)
BILIRUB UR STRIP.AUTO-MCNC: NEGATIVE MG/DL
BUN SERPL-MCNC: 34 MG/DL (ref 6–23)
CALCIUM SERPL-MCNC: 8.9 MG/DL (ref 8.6–10.3)
CHLORIDE SERPL-SCNC: 105 MMOL/L (ref 98–107)
CO2 SERPL-SCNC: 26 MMOL/L (ref 21–32)
COLOR UR: YELLOW
CREAT SERPL-MCNC: 1.26 MG/DL (ref 0.5–1.05)
EGFRCR SERPLBLD CKD-EPI 2021: 43 ML/MIN/1.73M*2
EOSINOPHIL # BLD AUTO: 0.1 X10*3/UL (ref 0–0.4)
EOSINOPHIL NFR BLD AUTO: 1.1 %
ERYTHROCYTE [DISTWIDTH] IN BLOOD BY AUTOMATED COUNT: 14.1 % (ref 11.5–14.5)
GLUCOSE SERPL-MCNC: 89 MG/DL (ref 74–99)
GLUCOSE UR STRIP.AUTO-MCNC: NORMAL MG/DL
HCT VFR BLD AUTO: 36 % (ref 36–46)
HGB BLD-MCNC: 11.7 G/DL (ref 12–16)
HYALINE CASTS #/AREA URNS AUTO: ABNORMAL /LPF
IMM GRANULOCYTES # BLD AUTO: 0.05 X10*3/UL (ref 0–0.5)
IMM GRANULOCYTES NFR BLD AUTO: 0.6 % (ref 0–0.9)
KETONES UR STRIP.AUTO-MCNC: ABNORMAL MG/DL
LEUKOCYTE ESTERASE UR QL STRIP.AUTO: ABNORMAL
LIPASE SERPL-CCNC: 27 U/L (ref 9–82)
LYMPHOCYTES # BLD AUTO: 1.21 X10*3/UL (ref 0.8–3)
LYMPHOCYTES NFR BLD AUTO: 13.3 %
MCH RBC QN AUTO: 29.3 PG (ref 26–34)
MCHC RBC AUTO-ENTMCNC: 32.5 G/DL (ref 32–36)
MCV RBC AUTO: 90 FL (ref 80–100)
MONOCYTES # BLD AUTO: 0.76 X10*3/UL (ref 0.05–0.8)
MONOCYTES NFR BLD AUTO: 8.4 %
MUCOUS THREADS #/AREA URNS AUTO: ABNORMAL /LPF
NEUTROPHILS # BLD AUTO: 6.92 X10*3/UL (ref 1.6–5.5)
NEUTROPHILS NFR BLD AUTO: 76 %
NITRITE UR QL STRIP.AUTO: NEGATIVE
NRBC BLD-RTO: 0 /100 WBCS (ref 0–0)
PH UR STRIP.AUTO: 5.5 [PH]
PLATELET # BLD AUTO: 224 X10*3/UL (ref 150–450)
POTASSIUM SERPL-SCNC: 4.8 MMOL/L (ref 3.5–5.3)
PROT SERPL-MCNC: 5.9 G/DL (ref 6.4–8.2)
PROT UR STRIP.AUTO-MCNC: ABNORMAL MG/DL
RBC # BLD AUTO: 4 X10*6/UL (ref 4–5.2)
RBC # UR STRIP.AUTO: NEGATIVE MG/DL
RBC #/AREA URNS AUTO: ABNORMAL /HPF
SODIUM SERPL-SCNC: 138 MMOL/L (ref 136–145)
SP GR UR STRIP.AUTO: 1.03
SQUAMOUS #/AREA URNS AUTO: ABNORMAL /HPF
UROBILINOGEN UR STRIP.AUTO-MCNC: ABNORMAL MG/DL
WBC # BLD AUTO: 9.1 X10*3/UL (ref 4.4–11.3)
WBC #/AREA URNS AUTO: ABNORMAL /HPF

## 2025-08-07 PROCEDURE — 74176 CT ABD & PELVIS W/O CONTRAST: CPT | Performed by: RADIOLOGY

## 2025-08-07 PROCEDURE — 2500000004 HC RX 250 GENERAL PHARMACY W/ HCPCS (ALT 636 FOR OP/ED): Performed by: NURSE PRACTITIONER

## 2025-08-07 PROCEDURE — 99284 EMERGENCY DEPT VISIT MOD MDM: CPT | Mod: 25

## 2025-08-07 PROCEDURE — 96374 THER/PROPH/DIAG INJ IV PUSH: CPT

## 2025-08-07 PROCEDURE — 2500000001 HC RX 250 WO HCPCS SELF ADMINISTERED DRUGS (ALT 637 FOR MEDICARE OP): Performed by: NURSE PRACTITIONER

## 2025-08-07 PROCEDURE — 74176 CT ABD & PELVIS W/O CONTRAST: CPT

## 2025-08-07 PROCEDURE — 81001 URINALYSIS AUTO W/SCOPE: CPT | Performed by: NURSE PRACTITIONER

## 2025-08-07 PROCEDURE — 85025 COMPLETE CBC W/AUTO DIFF WBC: CPT | Performed by: NURSE PRACTITIONER

## 2025-08-07 PROCEDURE — 36415 COLL VENOUS BLD VENIPUNCTURE: CPT | Performed by: NURSE PRACTITIONER

## 2025-08-07 PROCEDURE — 2500000002 HC RX 250 W HCPCS SELF ADMINISTERED DRUGS (ALT 637 FOR MEDICARE OP, ALT 636 FOR OP/ED): Performed by: NURSE PRACTITIONER

## 2025-08-07 PROCEDURE — 80053 COMPREHEN METABOLIC PANEL: CPT | Performed by: NURSE PRACTITIONER

## 2025-08-07 PROCEDURE — 87086 URINE CULTURE/COLONY COUNT: CPT | Mod: TRILAB | Performed by: NURSE PRACTITIONER

## 2025-08-07 PROCEDURE — 96375 TX/PRO/DX INJ NEW DRUG ADDON: CPT

## 2025-08-07 PROCEDURE — 83690 ASSAY OF LIPASE: CPT | Performed by: NURSE PRACTITIONER

## 2025-08-07 PROCEDURE — 96361 HYDRATE IV INFUSION ADD-ON: CPT

## 2025-08-07 RX ORDER — SULFAMETHOXAZOLE AND TRIMETHOPRIM 800; 160 MG/1; MG/1
1 TABLET ORAL 2 TIMES DAILY
Qty: 14 TABLET | Refills: 0 | Status: SHIPPED | OUTPATIENT
Start: 2025-08-07 | End: 2025-08-14

## 2025-08-07 RX ORDER — SULFAMETHOXAZOLE AND TRIMETHOPRIM 800; 160 MG/1; MG/1
1 TABLET ORAL ONCE
Status: COMPLETED | OUTPATIENT
Start: 2025-08-07 | End: 2025-08-07

## 2025-08-07 RX ORDER — ONDANSETRON HYDROCHLORIDE 2 MG/ML
4 INJECTION, SOLUTION INTRAVENOUS ONCE
Status: COMPLETED | OUTPATIENT
Start: 2025-08-07 | End: 2025-08-07

## 2025-08-07 RX ORDER — MORPHINE SULFATE 4 MG/ML
4 INJECTION, SOLUTION INTRAMUSCULAR; INTRAVENOUS ONCE
Status: COMPLETED | OUTPATIENT
Start: 2025-08-07 | End: 2025-08-07

## 2025-08-07 RX ORDER — IBUPROFEN 600 MG/1
600 TABLET, FILM COATED ORAL ONCE
Status: DISCONTINUED | OUTPATIENT
Start: 2025-08-07 | End: 2025-08-08 | Stop reason: HOSPADM

## 2025-08-07 RX ADMIN — SULFAMETHOXAZOLE AND TRIMETHOPRIM 1 TABLET: 800; 160 TABLET ORAL at 22:30

## 2025-08-07 RX ADMIN — SODIUM CHLORIDE 500 ML: 900 INJECTION, SOLUTION INTRAVENOUS at 19:36

## 2025-08-07 RX ADMIN — ONDANSETRON 4 MG: 2 INJECTION, SOLUTION INTRAMUSCULAR; INTRAVENOUS at 19:36

## 2025-08-07 RX ADMIN — SODIUM CHLORIDE 500 ML: 900 INJECTION, SOLUTION INTRAVENOUS at 22:17

## 2025-08-07 RX ADMIN — MORPHINE SULFATE 4 MG: 4 INJECTION, SOLUTION INTRAMUSCULAR; INTRAVENOUS at 19:36

## 2025-08-07 ASSESSMENT — PAIN SCALES - GENERAL
PAINLEVEL_OUTOF10: 5 - MODERATE PAIN
PAINLEVEL_OUTOF10: 6

## 2025-08-07 ASSESSMENT — PAIN - FUNCTIONAL ASSESSMENT
PAIN_FUNCTIONAL_ASSESSMENT: 0-10
PAIN_FUNCTIONAL_ASSESSMENT: 0-10

## 2025-08-07 ASSESSMENT — PAIN DESCRIPTION - LOCATION: LOCATION: BACK

## 2025-08-07 NOTE — ED PROVIDER NOTES
HPI   Chief Complaint   Patient presents with    Back Pain     Pt states that she has back pain from an L1 fracture diagnosed a month ago. States she also feels a new pain in the ribs.       HPI  See my MDM      Patient History   Medical History[1]  Surgical History[2]  Family History[3]  Social History[4]    Physical Exam   ED Triage Vitals [08/07/25 1832]   Temperature Heart Rate Respirations BP   36.1 °C (97 °F) 62 18 122/75      Pulse Ox Temp src Heart Rate Source Patient Position   99 % -- -- Sitting      BP Location FiO2 (%)     Right arm --       Physical Exam  CONSTITUTIONAL: Vital signs reviewed as charted, well-developed and in no distress  Eyes: Extraocular muscles are intact. Pupils equal round and reactive to light. Conjunctiva are pink.    ENT: Mucous membranes are moist. Tongue in the midline. Pharynx was without erythema or exudates, uvula midline  LUNGS: Breath sounds equal and clear to auscultation. Good air exchange, no wheezes rales or retractions, pulse oximetry is charted.  HEART: Regular rate and rhythm without murmur thrill or rub, strong tones, auscultation is normal.  ABDOMEN: Soft and nontender without guarding rebound rigidity or mass. Bowel sounds are present and normal in all quadrants. There is no palpable masses or aneurysms identified. No hepatosplenomegaly, right-sided CVA tenderness.  Neuro: The patient is awake, alert and oriented ×3. Moving all 4 extremities and answering questions appropriately.   MUSCULOSKELETAL: The calves are nontender to palpation. Full gross active range of motion.   PSYCH: Awake alert oriented, normal mood and affect.  Skin:  Dry, normal color, warm to the touch, no rash present.        ED Course & MDM   Diagnoses as of 08/07/25 2230   Acute right-sided low back pain without sciatica                 No data recorded                                 Medical Decision Making  History obtained from: patient    Vital signs, nursing notes, current medications,  past medical history, Surgical history, allergies, social history, family History were reviewed.         HPI:   Patient 79-year-old female present emergency room today complaining of right flank pain.  States this is different from the pain she has been experiencing from her L1 fracture.  States she also having some urinary urgency.  States she is felt general malaise.  She felt hot and cold today as well.  She denies dizziness, chest pain, shortness of breath or lower extremity edema.  She denies vomiting but admits to nausea.  Denies diarrhea.      10 point ROS was reviewed and negative except Noted above in HPI.  DDX: as listed above          MDM Summary/considerations:  Labs Reviewed   COMPREHENSIVE METABOLIC PANEL - Abnormal       Result Value    Glucose 89      Sodium 138      Potassium 4.8      Chloride 105      Bicarbonate 26      Anion Gap 12      Urea Nitrogen 34 (*)     Creatinine 1.26 (*)     eGFR 43 (*)     Calcium 8.9      Albumin 3.7      Alkaline Phosphatase 129      Total Protein 5.9 (*)     AST 15      Bilirubin, Total 0.5      ALT 13     CBC WITH AUTO DIFFERENTIAL - Abnormal    WBC 9.1      nRBC 0.0      RBC 4.00      Hemoglobin 11.7 (*)     Hematocrit 36.0      MCV 90      MCH 29.3      MCHC 32.5      RDW 14.1      Platelets 224      Neutrophils % 76.0      Immature Granulocytes %, Automated 0.6      Lymphocytes % 13.3      Monocytes % 8.4      Eosinophils % 1.1      Basophils % 0.6      Neutrophils Absolute 6.92 (*)     Immature Granulocytes Absolute, Automated 0.05      Lymphocytes Absolute 1.21      Monocytes Absolute 0.76      Eosinophils Absolute 0.10      Basophils Absolute 0.05     URINALYSIS WITH REFLEX CULTURE AND MICROSCOPIC - Abnormal    Color, Urine Yellow      Appearance, Urine Turbid (*)     Specific Gravity, Urine 1.032      pH, Urine 5.5      Protein, Urine 20 (TRACE)      Glucose, Urine Normal      Blood, Urine NEGATIVE      Ketones, Urine TRACE (*)     Bilirubin, Urine NEGATIVE       Urobilinogen, Urine 2 (1+) (*)     Nitrite, Urine NEGATIVE      Leukocyte Esterase, Urine 250 Markie/uL (*)    MICROSCOPIC ONLY, URINE - Abnormal    WBC, Urine 6-10 (*)     RBC, Urine 1-2      Squamous Epithelial Cells, Urine 1-9 (SPARSE)      Bacteria, Urine 1+ (*)     Mucus, Urine 2+      Hyaline Casts, Urine 1+ (*)    LIPASE - Normal    Lipase 27      Narrative:     Venipuncture immediately after or during the administration of Metamizole may lead to falsely low results. Testing should be performed immediately prior to Metamizole dosing.   URINE CULTURE   URINALYSIS WITH REFLEX CULTURE AND MICROSCOPIC    Narrative:     The following orders were created for panel order Urinalysis with Reflex Culture and Microscopic.  Procedure                               Abnormality         Status                     ---------                               -----------         ------                     Urinalysis with Reflex C...[[id number]]  Abnormal            Final result               Extra Urine Gray Tube[[id number]]                            In process                   Please view results for these tests on the individual orders.   EXTRA URINE GRAY TUBE     CT abdomen pelvis wo IV contrast   Final Result   No acute abdominal or pelvic process.        Additional findings as noted above.        MACRO:   None        Signed by: Apolinar Garcia 8/7/2025 7:42 PM   Dictation workstation:   [id number]        Medications   sodium chloride 0.9 % bolus 500 mL (500 mL intravenous New Bag 8/7/25 2217)   ibuprofen tablet 600 mg (has no administration in time range)   sulfamethoxazole-trimethoprim (Bactrim DS) 800-160 mg per tablet 1 tablet (has no administration in time range)   morphine injection 4 mg (4 mg intravenous Given 8/7/25 1936)   ondansetron (Zofran) injection 4 mg (4 mg intravenous Given 8/7/25 1936)   sodium chloride 0.9 % bolus 500 mL (0 mL intravenous Stopped 8/7/25 2207)     New Prescriptions     SULFAMETHOXAZOLE-TRIMETHOPRIM (BACTRIM DS) 800-160 MG TABLET    Take 1 tablet by mouth 2 times a day for 7 days.     I estimate there is a low risk for abdominal aortic aneurysm, cauda equina syndrome, epidural mass lesion, spinal stenosis, herniated disc causing severe stenosis, thus I considered the discharge disposition reasonable. I have discussed the diagnosis and risks with the patient and we agreed with discharging home to follow up with her primary care doctor. I also discussed returning to the emergency department immediately if new or worsening symptoms occur. I have discussed the symptoms which are most concerning such as saddle anesthesia, urinary or bowel incontinence or retention, and changing or worsening pain that would necessitate immediate return.        T scan  Shows some renal cyst but no acute abnormality.  Cysts were present on a CT scan done in 2015 have recommended following the PCP for reevaluation of these.  It is likely related to the patient's back pain has been ongoing.  Patient was feeling better after meds here in the ED.  Discharged home stable condition will follow PCP for reevaluation.    All of the patient's questions were answered to the best of my ability.  Patient states understanding that they have been screened for an emergency today and we have not found any etiology of symptoms that requires emergent treatment or admission to the hospital at this point. They understand that they have not had definitive care day and require follow-up for treatment of their condition. They also state understanding that they may have an emergent condition that may potentially have not of detected at this visit and they must return to the emergency department if they develop any worsening of symptoms or new complaints.      I have evaluated this patient, my supervising physician was available for consultation.              Critical Care: Not warranted at this time        This chart was completed  using voice recognition transcription software. Please excuse any errors of transcription including grammatical, punctuation, syntax and spelling errors.  Please contact me with any questions regarding this chart.    Procedure  Procedures       [1]   Past Medical History:  Diagnosis Date    A-fib (Multi) 2023    Acute maxillary sinusitis, unspecified 2016    Acute maxillary sinusitis    Allergic rhinitis, unspecified 2013    Allergic rhinitis    Anemia, unspecified 2013    Anemia    Cough, unspecified 12/15/2015    Cough    Cough, unspecified 2014    Cough    Cough, unspecified 2015    Cough    MARTE (dyspnea on exertion) 2023    Dysphonia     Dysphonia    Gastro-esophageal reflux disease without esophagitis 2013    Esophageal reflux    Generalized abdominal pain 2014    Generalized abdominal pain    Hyperlipidemia 2023    Hypertension 2023    Other cervical disc displacement, unspecified cervical region     Herniated disc, cervical    Palpitations 2023    Paroxysmal atrial fibrillation (Multi) 2023    Personal history of other diseases of the respiratory system 2014    History of allergic rhinitis    Personal history of other diseases of the respiratory system 04/10/2014    History of allergic rhinitis    Personal history of transient ischemic attack (TIA), and cerebral infarction without residual deficits     History of transient cerebral ischemia    Unspecified fracture of shaft of humerus, right arm, initial encounter for closed fracture 2017    Fracture of humerus, right, closed   [2]   Past Surgical History:  Procedure Laterality Date    BLADDER SURGERY  2014    Bladder Surgery     SECTION, CLASSIC  2014     Section    EYE SURGERY  2014    Eye Surgery    HYSTERECTOMY  2014    Hysterectomy   [3]   Family History  Problem Relation Name Age of Onset    Hypertension Mother      Cancer  Mother      Cancer Father      Heart attack Father      Hypertension Brother     [4]   Social History  Tobacco Use    Smoking status: Never     Passive exposure: Past    Smokeless tobacco: Never   Vaping Use    Vaping status: Never Used   Substance Use Topics    Alcohol use: Not Currently    Drug use: Not Currently        TAMMY Singh  08/07/25 [id number]

## 2025-08-08 LAB — HOLD SPECIMEN: NORMAL

## 2025-08-08 NOTE — DISCHARGE INSTRUCTIONS
Please return to the emergency department immediately if new or worsening symptoms occur. Symptoms which are most concerning are urinary or bowel incontinence or retention, changing or worsening pain, and saddle anesthesia.    Thank you for allowing us to take care of you today. While you are home, you might receive a survey about your care in our hospital. Your nurse and myself, Todd Jeffers CNP, would love your honest feedback on your care. Your feedback and especially your positive comments help our hospital receive the support we need to continue to serve you and your family. Thank you again for trusting us with your care.

## 2025-08-09 LAB — BACTERIA UR CULT: NORMAL

## 2025-08-13 LAB
ALBUMIN SERPL-MCNC: 3.8 G/DL (ref 3.6–5.1)
ALP SERPL-CCNC: 129 U/L (ref 37–153)
ALT SERPL-CCNC: 13 U/L (ref 6–29)
ANION GAP SERPL CALCULATED.4IONS-SCNC: 9 MMOL/L (CALC) (ref 7–17)
AST SERPL-CCNC: 13 U/L (ref 10–35)
BILIRUB SERPL-MCNC: 0.4 MG/DL (ref 0.2–1.2)
BUN SERPL-MCNC: 21 MG/DL (ref 7–25)
CALCIUM SERPL-MCNC: 8.9 MG/DL (ref 8.6–10.4)
CHLORIDE SERPL-SCNC: 106 MMOL/L (ref 98–110)
CO2 SERPL-SCNC: 23 MMOL/L (ref 20–32)
CREAT SERPL-MCNC: 1.26 MG/DL (ref 0.6–0.95)
EGFRCR SERPLBLD CKD-EPI 2021: 43 ML/MIN/1.73M2
ERYTHROCYTE [DISTWIDTH] IN BLOOD BY AUTOMATED COUNT: 13.3 % (ref 11–15)
GLUCOSE SERPL-MCNC: 92 MG/DL (ref 65–139)
HCT VFR BLD AUTO: 36.5 % (ref 35–45)
HGB BLD-MCNC: 11.8 G/DL (ref 11.7–15.5)
MCH RBC QN AUTO: 29.6 PG (ref 27–33)
MCHC RBC AUTO-ENTMCNC: 32.3 G/DL (ref 32–36)
MCV RBC AUTO: 91.5 FL (ref 80–100)
PLATELET # BLD AUTO: 225 THOUSAND/UL (ref 140–400)
PMV BLD REES-ECKER: 11.9 FL (ref 7.5–12.5)
POTASSIUM SERPL-SCNC: 5.2 MMOL/L (ref 3.5–5.3)
PROT SERPL-MCNC: 5.7 G/DL (ref 6.1–8.1)
RBC # BLD AUTO: 3.99 MILLION/UL (ref 3.8–5.1)
SODIUM SERPL-SCNC: 138 MMOL/L (ref 135–146)
WBC # BLD AUTO: 7.3 THOUSAND/UL (ref 3.8–10.8)

## 2025-08-14 ASSESSMENT — ENCOUNTER SYMPTOMS
NAUSEA: 0
VOICE CHANGE: 0
PALPITATIONS: 0
ROS GI COMMENTS: DENIES ACID REFLUX
JOINT SWELLING: 0
MYALGIAS: 0
EYE ITCHING: 0
SINUS PRESSURE: 0
AGITATION: 0
ACTIVITY CHANGE: 0
WEAKNESS: 0
HEADACHES: 0
FEVER: 0
APPETITE CHANGE: 0
TREMORS: 1
CHILLS: 0
SINUS PAIN: 0
SLEEP DISTURBANCE: 1
VOMITING: 0
DIARRHEA: 0
WHEEZING: 0
ABDOMINAL PAIN: 0
FATIGUE: 1
SORE THROAT: 0
STRIDOR: 0
EYE REDNESS: 0
UNEXPECTED WEIGHT CHANGE: 0
NERVOUS/ANXIOUS: 0
DIZZINESS: 1
BRUISES/BLEEDS EASILY: 1
COUGH: 1
SHORTNESS OF BREATH: 0

## 2025-08-15 ENCOUNTER — APPOINTMENT (OUTPATIENT)
Dept: CARDIAC SURGERY | Facility: HOSPITAL | Age: 80
End: 2025-08-15
Payer: MEDICARE

## 2025-08-15 ENCOUNTER — APPOINTMENT (OUTPATIENT)
Dept: CARDIOLOGY | Facility: HOSPITAL | Age: 80
End: 2025-08-15
Payer: MEDICARE

## 2025-08-15 ENCOUNTER — HOSPITAL ENCOUNTER (OUTPATIENT)
Dept: RADIOLOGY | Facility: HOSPITAL | Age: 80
Discharge: HOME | End: 2025-08-15
Payer: MEDICARE

## 2025-08-15 DIAGNOSIS — I07.1 SEVERE TRICUSPID REGURGITATION: ICD-10-CM

## 2025-08-15 PROCEDURE — 2550000001 HC RX 255 CONTRASTS: Performed by: INTERNAL MEDICINE

## 2025-08-15 PROCEDURE — 74174 CTA ABD&PLVS W/CONTRAST: CPT

## 2025-08-15 RX ADMIN — IOHEXOL 50 ML: 350 INJECTION, SOLUTION INTRAVENOUS at 13:29

## 2025-08-18 ENCOUNTER — APPOINTMENT (OUTPATIENT)
Dept: RADIOLOGY | Facility: HOSPITAL | Age: 80
End: 2025-08-18
Payer: MEDICARE

## 2025-08-20 ENCOUNTER — OFFICE VISIT (OUTPATIENT)
Dept: PULMONOLOGY | Facility: CLINIC | Age: 80
End: 2025-08-20
Payer: MEDICARE

## 2025-08-20 VITALS
HEART RATE: 72 BPM | WEIGHT: 184 LBS | SYSTOLIC BLOOD PRESSURE: 109 MMHG | OXYGEN SATURATION: 97 % | HEIGHT: 65 IN | DIASTOLIC BLOOD PRESSURE: 70 MMHG | BODY MASS INDEX: 30.66 KG/M2

## 2025-08-20 DIAGNOSIS — R91.8 LUNG NODULES: ICD-10-CM

## 2025-08-20 DIAGNOSIS — J45.20 MILD INTERMITTENT REACTIVE AIRWAY DISEASE WITHOUT COMPLICATION (HHS-HCC): Primary | ICD-10-CM

## 2025-08-20 DIAGNOSIS — R09.82 POST-NASAL DRAINAGE: ICD-10-CM

## 2025-08-20 DIAGNOSIS — J31.0 CHRONIC RHINITIS: ICD-10-CM

## 2025-08-20 PROCEDURE — 3074F SYST BP LT 130 MM HG: CPT | Performed by: NURSE PRACTITIONER

## 2025-08-20 PROCEDURE — 99212 OFFICE O/P EST SF 10 MIN: CPT | Performed by: NURSE PRACTITIONER

## 2025-08-20 PROCEDURE — 1159F MED LIST DOCD IN RCRD: CPT | Performed by: NURSE PRACTITIONER

## 2025-08-20 PROCEDURE — 99214 OFFICE O/P EST MOD 30 MIN: CPT | Performed by: NURSE PRACTITIONER

## 2025-08-20 PROCEDURE — 1160F RVW MEDS BY RX/DR IN RCRD: CPT | Performed by: NURSE PRACTITIONER

## 2025-08-20 PROCEDURE — 3078F DIAST BP <80 MM HG: CPT | Performed by: NURSE PRACTITIONER

## 2025-08-20 PROCEDURE — 1036F TOBACCO NON-USER: CPT | Performed by: NURSE PRACTITIONER

## 2025-08-20 PROCEDURE — 1126F AMNT PAIN NOTED NONE PRSNT: CPT | Performed by: NURSE PRACTITIONER

## 2025-08-20 ASSESSMENT — ENCOUNTER SYMPTOMS
BACK PAIN: 1
CHEST TIGHTNESS: 1
ARTHRALGIAS: 1
TROUBLE SWALLOWING: 1
RHINORRHEA: 0

## 2025-08-20 ASSESSMENT — PAIN SCALES - GENERAL: PAINLEVEL_OUTOF10: 0-NO PAIN

## 2025-08-20 ASSESSMENT — PATIENT HEALTH QUESTIONNAIRE - PHQ9
SUM OF ALL RESPONSES TO PHQ9 QUESTIONS 1 & 2: 0
1. LITTLE INTEREST OR PLEASURE IN DOING THINGS: NOT AT ALL
2. FEELING DOWN, DEPRESSED OR HOPELESS: NOT AT ALL

## 2025-08-20 ASSESSMENT — LIFESTYLE VARIABLES: HOW MANY STANDARD DRINKS CONTAINING ALCOHOL DO YOU HAVE ON A TYPICAL DAY: PATIENT DOES NOT DRINK

## 2025-08-29 ENCOUNTER — OFFICE VISIT (OUTPATIENT)
Dept: CARDIOLOGY | Facility: HOSPITAL | Age: 80
End: 2025-08-29
Payer: MEDICARE

## 2025-08-29 VITALS
WEIGHT: 185 LBS | OXYGEN SATURATION: 97 % | HEIGHT: 65 IN | HEART RATE: 85 BPM | SYSTOLIC BLOOD PRESSURE: 119 MMHG | DIASTOLIC BLOOD PRESSURE: 74 MMHG | BODY MASS INDEX: 30.82 KG/M2

## 2025-08-29 DIAGNOSIS — I07.1 SEVERE TRICUSPID REGURGITATION: Primary | ICD-10-CM

## 2025-08-29 DIAGNOSIS — I42.2 HYPERTROPHIC CARDIOMYOPATHY (MULTI): ICD-10-CM

## 2025-08-29 DIAGNOSIS — I42.1 HOCM (HYPERTROPHIC OBSTRUCTIVE CARDIOMYOPATHY) (MULTI): ICD-10-CM

## 2025-08-29 DIAGNOSIS — Z91.041 ALLERGY TO INTRAVENOUS CONTRAST: Primary | ICD-10-CM

## 2025-08-29 PROCEDURE — 3078F DIAST BP <80 MM HG: CPT | Performed by: INTERNAL MEDICINE

## 2025-08-29 PROCEDURE — 1125F AMNT PAIN NOTED PAIN PRSNT: CPT | Performed by: INTERNAL MEDICINE

## 2025-08-29 PROCEDURE — 99204 OFFICE O/P NEW MOD 45 MIN: CPT | Performed by: INTERNAL MEDICINE

## 2025-08-29 PROCEDURE — 99212 OFFICE O/P EST SF 10 MIN: CPT

## 2025-08-29 PROCEDURE — 1159F MED LIST DOCD IN RCRD: CPT | Performed by: INTERNAL MEDICINE

## 2025-08-29 PROCEDURE — 3074F SYST BP LT 130 MM HG: CPT | Performed by: INTERNAL MEDICINE

## 2025-08-29 RX ORDER — DIPHENHYDRAMINE HCL 50 MG
CAPSULE ORAL
Qty: 30 CAPSULE | Refills: 0 | Status: SHIPPED | OUTPATIENT
Start: 2025-08-29

## 2025-08-29 RX ORDER — PREDNISONE 50 MG/1
50 TABLET ORAL SEE ADMIN INSTRUCTIONS
Qty: 3 TABLET | Refills: 3 | Status: SHIPPED | OUTPATIENT
Start: 2025-08-29

## 2025-08-29 ASSESSMENT — PAIN SCALES - GENERAL: PAINLEVEL_OUTOF10: 2

## 2025-09-02 PROBLEM — R49.0 DYSPHONIA: Status: ACTIVE | Noted: 2025-09-02

## 2025-09-02 PROBLEM — D64.9 ANEMIA: Status: ACTIVE | Noted: 2025-09-02

## 2025-09-02 PROBLEM — K21.9 GASTROESOPHAGEAL REFLUX DISEASE: Status: ACTIVE | Noted: 2023-09-05

## 2025-09-03 ENCOUNTER — HOSPITAL ENCOUNTER (OUTPATIENT)
Dept: RADIOLOGY | Facility: CLINIC | Age: 80
Discharge: HOME | End: 2025-09-03
Payer: MEDICARE

## 2025-09-03 ENCOUNTER — OFFICE VISIT (OUTPATIENT)
Dept: ORTHOPEDIC SURGERY | Facility: CLINIC | Age: 80
End: 2025-09-03
Payer: MEDICARE

## 2025-09-03 VITALS — WEIGHT: 185 LBS | BODY MASS INDEX: 30.82 KG/M2 | HEIGHT: 65 IN

## 2025-09-03 DIAGNOSIS — S32.000S LUMBAR COMPRESSION FRACTURE, SEQUELA: ICD-10-CM

## 2025-09-03 DIAGNOSIS — S32.000S LUMBAR COMPRESSION FRACTURE, SEQUELA: Primary | ICD-10-CM

## 2025-09-03 PROCEDURE — 72110 X-RAY EXAM L-2 SPINE 4/>VWS: CPT | Performed by: RADIOLOGY

## 2025-09-03 PROCEDURE — 1125F AMNT PAIN NOTED PAIN PRSNT: CPT | Performed by: STUDENT IN AN ORGANIZED HEALTH CARE EDUCATION/TRAINING PROGRAM

## 2025-09-03 PROCEDURE — 99213 OFFICE O/P EST LOW 20 MIN: CPT | Performed by: STUDENT IN AN ORGANIZED HEALTH CARE EDUCATION/TRAINING PROGRAM

## 2025-09-03 PROCEDURE — 72110 X-RAY EXAM L-2 SPINE 4/>VWS: CPT

## 2025-09-03 PROCEDURE — 1159F MED LIST DOCD IN RCRD: CPT | Performed by: STUDENT IN AN ORGANIZED HEALTH CARE EDUCATION/TRAINING PROGRAM

## 2025-09-03 PROCEDURE — 1036F TOBACCO NON-USER: CPT | Performed by: STUDENT IN AN ORGANIZED HEALTH CARE EDUCATION/TRAINING PROGRAM

## 2025-09-03 ASSESSMENT — PAIN DESCRIPTION - DESCRIPTORS: DESCRIPTORS: ACHING

## 2025-09-03 ASSESSMENT — PAIN SCALES - GENERAL: PAINLEVEL_OUTOF10: 1

## 2025-09-03 ASSESSMENT — PAIN - FUNCTIONAL ASSESSMENT: PAIN_FUNCTIONAL_ASSESSMENT: 0-10

## 2025-09-04 ENCOUNTER — APPOINTMENT (OUTPATIENT)
Dept: PRIMARY CARE | Facility: CLINIC | Age: 80
End: 2025-09-04
Payer: MEDICARE

## 2025-09-04 DIAGNOSIS — E11.9 TYPE 2 DIABETES, DIET CONTROLLED: ICD-10-CM

## 2025-09-04 DIAGNOSIS — I42.2 HYPERTROPHIC CARDIOMYOPATHY (MULTI): ICD-10-CM

## 2025-09-04 DIAGNOSIS — I10 PRIMARY HYPERTENSION: Primary | ICD-10-CM

## 2025-09-04 DIAGNOSIS — I07.1 SEVERE TRICUSPID REGURGITATION: ICD-10-CM

## 2025-09-10 ENCOUNTER — APPOINTMENT (OUTPATIENT)
Dept: ALLERGY | Facility: CLINIC | Age: 80
End: 2025-09-10
Payer: MEDICARE

## 2025-09-30 ENCOUNTER — APPOINTMENT (OUTPATIENT)
Dept: PRIMARY CARE | Facility: CLINIC | Age: 80
End: 2025-09-30
Payer: MEDICARE

## 2025-10-08 ENCOUNTER — APPOINTMENT (OUTPATIENT)
Dept: SLEEP MEDICINE | Facility: CLINIC | Age: 80
End: 2025-10-08
Payer: MEDICARE

## 2025-10-20 ENCOUNTER — APPOINTMENT (OUTPATIENT)
Dept: CARDIOLOGY | Facility: CLINIC | Age: 80
End: 2025-10-20
Payer: MEDICARE

## 2025-11-12 ENCOUNTER — APPOINTMENT (OUTPATIENT)
Dept: OTOLARYNGOLOGY | Facility: CLINIC | Age: 80
End: 2025-11-12
Payer: MEDICARE